# Patient Record
Sex: MALE | Race: WHITE | NOT HISPANIC OR LATINO | Employment: FULL TIME | URBAN - NONMETROPOLITAN AREA
[De-identification: names, ages, dates, MRNs, and addresses within clinical notes are randomized per-mention and may not be internally consistent; named-entity substitution may affect disease eponyms.]

---

## 2017-11-07 ENCOUNTER — APPOINTMENT (OUTPATIENT)
Dept: GENERAL RADIOLOGY | Facility: HOSPITAL | Age: 57
End: 2017-11-07

## 2017-11-07 ENCOUNTER — HOSPITAL ENCOUNTER (INPATIENT)
Facility: HOSPITAL | Age: 57
LOS: 3 days | Discharge: HOME | End: 2017-11-10
Attending: EMERGENCY MEDICINE | Admitting: INTERNAL MEDICINE

## 2017-11-07 DIAGNOSIS — N28.9 RENAL INSUFFICIENCY: ICD-10-CM

## 2017-11-07 DIAGNOSIS — I21.4 ACUTE NON-ST SEGMENT ELEVATION MYOCARDIAL INFARCTION (HCC): Primary | ICD-10-CM

## 2017-11-07 DIAGNOSIS — Z86.39 HISTORY OF DIABETES MELLITUS: ICD-10-CM

## 2017-11-07 LAB
ACT BLD: 208 SECONDS (ref 82–152)
ACT BLD: 221 SECONDS (ref 82–152)
ACT BLD: 225 SECONDS (ref 82–152)
ALBUMIN SERPL-MCNC: 4.9 G/DL (ref 3.5–5)
ALBUMIN/GLOB SERPL: 1.6 G/DL (ref 1–2)
ALP SERPL-CCNC: 109 U/L (ref 38–126)
ALT SERPL W P-5'-P-CCNC: 36 U/L (ref 13–69)
ANION GAP SERPL CALCULATED.3IONS-SCNC: 19.7 MMOL/L
AST SERPL-CCNC: 20 U/L (ref 15–46)
BASOPHILS # BLD AUTO: 0.05 10*3/MM3 (ref 0–0.2)
BASOPHILS NFR BLD AUTO: 0.7 % (ref 0–2.5)
BILIRUB SERPL-MCNC: 0.3 MG/DL (ref 0.2–1.3)
BUN BLD-MCNC: 27 MG/DL (ref 7–20)
BUN/CREAT SERPL: 15.9 (ref 6.3–21.9)
CALCIUM SPEC-SCNC: 9.6 MG/DL (ref 8.4–10.2)
CHLORIDE SERPL-SCNC: 103 MMOL/L (ref 98–107)
CO2 SERPL-SCNC: 26 MMOL/L (ref 26–30)
CREAT BLD-MCNC: 1.7 MG/DL (ref 0.6–1.3)
DEPRECATED RDW RBC AUTO: 42.9 FL (ref 37–54)
EOSINOPHIL # BLD AUTO: 0.12 10*3/MM3 (ref 0–0.7)
EOSINOPHIL NFR BLD AUTO: 1.7 % (ref 0–7)
ERYTHROCYTE [DISTWIDTH] IN BLOOD BY AUTOMATED COUNT: 12.7 % (ref 11.5–14.5)
GFR SERPL CREATININE-BSD FRML MDRD: 42 ML/MIN/1.73
GLOBULIN UR ELPH-MCNC: 3 GM/DL
GLUCOSE BLD-MCNC: 133 MG/DL (ref 74–98)
HCT VFR BLD AUTO: 45.3 % (ref 42–52)
HGB BLD-MCNC: 14.8 G/DL (ref 14–18)
HOLD SPECIMEN: NORMAL
HOLD SPECIMEN: NORMAL
IMM GRANULOCYTES # BLD: 0.02 10*3/MM3 (ref 0–0.06)
IMM GRANULOCYTES NFR BLD: 0.3 % (ref 0–0.6)
LIPASE SERPL-CCNC: 168 U/L (ref 23–300)
LYMPHOCYTES # BLD AUTO: 2.15 10*3/MM3 (ref 0.6–3.4)
LYMPHOCYTES NFR BLD AUTO: 30 % (ref 10–50)
MCH RBC QN AUTO: 30.1 PG (ref 27–31)
MCHC RBC AUTO-ENTMCNC: 32.7 G/DL (ref 30–37)
MCV RBC AUTO: 92.1 FL (ref 80–94)
MONOCYTES # BLD AUTO: 0.71 10*3/MM3 (ref 0–0.9)
MONOCYTES NFR BLD AUTO: 9.9 % (ref 0–12)
NEUTROPHILS # BLD AUTO: 4.11 10*3/MM3 (ref 2–6.9)
NEUTROPHILS NFR BLD AUTO: 57.4 % (ref 37–80)
NRBC BLD MANUAL-RTO: 0 /100 WBC (ref 0–0)
PLATELET # BLD AUTO: 181 10*3/MM3 (ref 130–400)
PMV BLD AUTO: 11.6 FL (ref 6–12)
POTASSIUM BLD-SCNC: 4.7 MMOL/L (ref 3.5–5.1)
PROT SERPL-MCNC: 7.9 G/DL (ref 6.3–8.2)
RBC # BLD AUTO: 4.92 10*6/MM3 (ref 4.7–6.1)
SODIUM BLD-SCNC: 144 MMOL/L (ref 137–145)
TROPONIN I SERPL-MCNC: 0 NG/ML (ref 0–0.05)
TROPONIN I SERPL-MCNC: 0.14 NG/ML (ref 0–0.03)
TROPONIN I SERPL-MCNC: <0.012 NG/ML (ref 0–0.03)
WBC NRBC COR # BLD: 7.16 10*3/MM3 (ref 4.8–10.8)
WHOLE BLOOD HOLD SPECIMEN: NORMAL
WHOLE BLOOD HOLD SPECIMEN: NORMAL

## 2017-11-07 PROCEDURE — 92941 PRQ TRLML REVSC TOT OCCL AMI: CPT | Performed by: INTERNAL MEDICINE

## 2017-11-07 PROCEDURE — 93005 ELECTROCARDIOGRAM TRACING: CPT

## 2017-11-07 PROCEDURE — B2111ZZ FLUOROSCOPY OF MULTIPLE CORONARY ARTERIES USING LOW OSMOLAR CONTRAST: ICD-10-PCS | Performed by: INTERNAL MEDICINE

## 2017-11-07 PROCEDURE — 027146Z DILATION OF CORONARY ARTERY, TWO ARTERIES WITH THREE DRUG-ELUTING INTRALUMINAL DEVICES, PERCUTANEOUS ENDOSCOPIC APPROACH: ICD-10-PCS | Performed by: INTERNAL MEDICINE

## 2017-11-07 PROCEDURE — C1760 CLOSURE DEV, VASC: HCPCS | Performed by: INTERNAL MEDICINE

## 2017-11-07 PROCEDURE — 4A023N7 MEASUREMENT OF CARDIAC SAMPLING AND PRESSURE, LEFT HEART, PERCUTANEOUS APPROACH: ICD-10-PCS | Performed by: INTERNAL MEDICINE

## 2017-11-07 PROCEDURE — C1874 STENT, COATED/COV W/DEL SYS: HCPCS | Performed by: INTERNAL MEDICINE

## 2017-11-07 PROCEDURE — 84484 ASSAY OF TROPONIN QUANT: CPT

## 2017-11-07 PROCEDURE — 93005 ELECTROCARDIOGRAM TRACING: CPT | Performed by: EMERGENCY MEDICINE

## 2017-11-07 PROCEDURE — C1769 GUIDE WIRE: HCPCS | Performed by: INTERNAL MEDICINE

## 2017-11-07 PROCEDURE — 85025 COMPLETE CBC W/AUTO DIFF WBC: CPT

## 2017-11-07 PROCEDURE — 99152 MOD SED SAME PHYS/QHP 5/>YRS: CPT | Performed by: INTERNAL MEDICINE

## 2017-11-07 PROCEDURE — C9600 PERC DRUG-EL COR STENT SING: HCPCS | Performed by: INTERNAL MEDICINE

## 2017-11-07 PROCEDURE — 25010000002 EPTIFIBATIDE PER 5 MG: Performed by: INTERNAL MEDICINE

## 2017-11-07 PROCEDURE — C1725 CATH, TRANSLUMIN NON-LASER: HCPCS | Performed by: INTERNAL MEDICINE

## 2017-11-07 PROCEDURE — 93458 L HRT ARTERY/VENTRICLE ANGIO: CPT | Performed by: INTERNAL MEDICINE

## 2017-11-07 PROCEDURE — 25010000002 FENTANYL CITRATE (PF) 100 MCG/2ML SOLUTION: Performed by: INTERNAL MEDICINE

## 2017-11-07 PROCEDURE — C9601 PERC DRUG-EL COR STENT BRAN: HCPCS | Performed by: INTERNAL MEDICINE

## 2017-11-07 PROCEDURE — 99153 MOD SED SAME PHYS/QHP EA: CPT | Performed by: INTERNAL MEDICINE

## 2017-11-07 PROCEDURE — C1887 CATHETER, GUIDING: HCPCS | Performed by: INTERNAL MEDICINE

## 2017-11-07 PROCEDURE — 25010000002 HEPARIN (PORCINE) PER 1000 UNITS: Performed by: INTERNAL MEDICINE

## 2017-11-07 PROCEDURE — 0 IOPAMIDOL PER 1 ML: Performed by: INTERNAL MEDICINE

## 2017-11-07 PROCEDURE — 25010000002 MIDAZOLAM PER 1 MG: Performed by: INTERNAL MEDICINE

## 2017-11-07 PROCEDURE — 85347 COAGULATION TIME ACTIVATED: CPT

## 2017-11-07 PROCEDURE — 92928 PRQ TCAT PLMT NTRAC ST 1 LES: CPT | Performed by: INTERNAL MEDICINE

## 2017-11-07 PROCEDURE — 80053 COMPREHEN METABOLIC PANEL: CPT

## 2017-11-07 PROCEDURE — 99223 1ST HOSP IP/OBS HIGH 75: CPT | Performed by: INTERNAL MEDICINE

## 2017-11-07 PROCEDURE — C1894 INTRO/SHEATH, NON-LASER: HCPCS | Performed by: INTERNAL MEDICINE

## 2017-11-07 PROCEDURE — 99285 EMERGENCY DEPT VISIT HI MDM: CPT

## 2017-11-07 PROCEDURE — C9606 PERC D-E COR REVASC W AMI S: HCPCS | Performed by: INTERNAL MEDICINE

## 2017-11-07 PROCEDURE — 84484 ASSAY OF TROPONIN QUANT: CPT | Performed by: PHYSICIAN ASSISTANT

## 2017-11-07 PROCEDURE — 83690 ASSAY OF LIPASE: CPT | Performed by: PHYSICIAN ASSISTANT

## 2017-11-07 PROCEDURE — 92929 PR PRQ TRLUML CORONARY STENT W/ANGIO ADDL ART/BRNCH: CPT | Performed by: INTERNAL MEDICINE

## 2017-11-07 DEVICE — XIENCE ALPINE EVEROLIMUS ELUTING CORONARY STENT SYSTEM 3.50 MM X 15 MM / RAPID-EXCHANGE
Type: IMPLANTABLE DEVICE | Status: FUNCTIONAL
Brand: XIENCE ALPINE

## 2017-11-07 DEVICE — XIENCE ALPINE EVEROLIMUS ELUTING CORONARY STENT SYSTEM 2.50 MM X 18 MM / RAPID-EXCHANGE
Type: IMPLANTABLE DEVICE | Status: FUNCTIONAL
Brand: XIENCE ALPINE

## 2017-11-07 DEVICE — XIENCE ALPINE EVEROLIMUS ELUTING CORONARY STENT SYSTEM 4.00 MM X 18 MM / RAPID-EXCHANGE
Type: IMPLANTABLE DEVICE | Status: FUNCTIONAL
Brand: XIENCE ALPINE

## 2017-11-07 DEVICE — XIENCE ALPINE EVEROLIMUS ELUTING CORONARY STENT SYSTEM 3.00 MM X 38 MM / RAPID-EXCHANGE
Type: IMPLANTABLE DEVICE | Status: FUNCTIONAL
Brand: XIENCE ALPINE

## 2017-11-07 RX ORDER — NITROGLYCERIN 5 MG/ML
INJECTION, SOLUTION INTRAVENOUS AS NEEDED
Status: DISCONTINUED | OUTPATIENT
Start: 2017-11-07 | End: 2017-11-07 | Stop reason: HOSPADM

## 2017-11-07 RX ORDER — ALPRAZOLAM 0.5 MG/1
0.5 TABLET ORAL 3 TIMES DAILY PRN
Status: DISCONTINUED | OUTPATIENT
Start: 2017-11-07 | End: 2017-11-10 | Stop reason: HOSPADM

## 2017-11-07 RX ORDER — LIDOCAINE HYDROCHLORIDE 10 MG/ML
INJECTION, SOLUTION INFILTRATION; PERINEURAL AS NEEDED
Status: DISCONTINUED | OUTPATIENT
Start: 2017-11-07 | End: 2017-11-07 | Stop reason: HOSPADM

## 2017-11-07 RX ORDER — ONDANSETRON 4 MG/1
4 TABLET, ORALLY DISINTEGRATING ORAL EVERY 6 HOURS PRN
Status: DISCONTINUED | OUTPATIENT
Start: 2017-11-07 | End: 2017-11-10 | Stop reason: HOSPADM

## 2017-11-07 RX ORDER — GLIPIZIDE 5 MG/1
2.5 TABLET ORAL
Status: DISCONTINUED | OUTPATIENT
Start: 2017-11-08 | End: 2017-11-10 | Stop reason: HOSPADM

## 2017-11-07 RX ORDER — SODIUM CHLORIDE 9 MG/ML
125 INJECTION, SOLUTION INTRAVENOUS CONTINUOUS
Status: ACTIVE | OUTPATIENT
Start: 2017-11-07 | End: 2017-11-08

## 2017-11-07 RX ORDER — PANTOPRAZOLE SODIUM 40 MG/1
40 TABLET, DELAYED RELEASE ORAL ONCE
Status: COMPLETED | OUTPATIENT
Start: 2017-11-07 | End: 2017-11-07

## 2017-11-07 RX ORDER — ONDANSETRON 4 MG/1
4 TABLET, FILM COATED ORAL EVERY 6 HOURS PRN
Status: DISCONTINUED | OUTPATIENT
Start: 2017-11-07 | End: 2017-11-10 | Stop reason: HOSPADM

## 2017-11-07 RX ORDER — ONDANSETRON 2 MG/ML
4 INJECTION INTRAMUSCULAR; INTRAVENOUS EVERY 6 HOURS PRN
Status: DISCONTINUED | OUTPATIENT
Start: 2017-11-07 | End: 2017-11-10 | Stop reason: HOSPADM

## 2017-11-07 RX ORDER — EPTIFIBATIDE 20 MG/10ML
180 INJECTION INTRAVENOUS ONCE
Status: DISCONTINUED | OUTPATIENT
Start: 2017-11-07 | End: 2017-11-07

## 2017-11-07 RX ORDER — EPTIFIBATIDE 0.75 MG/ML
2 INJECTION, SOLUTION INTRAVENOUS CONTINUOUS
Status: DISPENSED | OUTPATIENT
Start: 2017-11-07 | End: 2017-11-08

## 2017-11-07 RX ORDER — ASPIRIN 325 MG
TABLET ORAL AS NEEDED
Status: DISCONTINUED | OUTPATIENT
Start: 2017-11-07 | End: 2017-11-07 | Stop reason: HOSPADM

## 2017-11-07 RX ORDER — SODIUM CHLORIDE 0.9 % (FLUSH) 0.9 %
10 SYRINGE (ML) INJECTION AS NEEDED
Status: DISCONTINUED | OUTPATIENT
Start: 2017-11-07 | End: 2017-11-10 | Stop reason: HOSPADM

## 2017-11-07 RX ORDER — ATORVASTATIN CALCIUM 40 MG/1
40 TABLET, FILM COATED ORAL DAILY
COMMUNITY
End: 2018-03-20 | Stop reason: SDUPTHER

## 2017-11-07 RX ORDER — CARVEDILOL 25 MG/1
25 TABLET ORAL 2 TIMES DAILY
COMMUNITY
End: 2017-11-10 | Stop reason: HOSPADM

## 2017-11-07 RX ORDER — ASPIRIN 325 MG
325 TABLET ORAL ONCE
Status: DISCONTINUED | OUTPATIENT
Start: 2017-11-07 | End: 2017-11-08

## 2017-11-07 RX ORDER — TEMAZEPAM 15 MG/1
15 CAPSULE ORAL NIGHTLY PRN
Status: DISCONTINUED | OUTPATIENT
Start: 2017-11-07 | End: 2017-11-10 | Stop reason: HOSPADM

## 2017-11-07 RX ORDER — CARVEDILOL 6.25 MG/1
3.12 TABLET ORAL 2 TIMES DAILY
Status: DISCONTINUED | OUTPATIENT
Start: 2017-11-07 | End: 2017-11-10 | Stop reason: HOSPADM

## 2017-11-07 RX ORDER — HEPARIN SODIUM 1000 [USP'U]/ML
INJECTION, SOLUTION INTRAVENOUS; SUBCUTANEOUS AS NEEDED
Status: DISCONTINUED | OUTPATIENT
Start: 2017-11-07 | End: 2017-11-07 | Stop reason: HOSPADM

## 2017-11-07 RX ORDER — EPTIFIBATIDE 0.75 MG/ML
INJECTION, SOLUTION INTRAVENOUS CONTINUOUS PRN
Status: DISCONTINUED | OUTPATIENT
Start: 2017-11-07 | End: 2017-11-07 | Stop reason: HOSPADM

## 2017-11-07 RX ORDER — LISINOPRIL 20 MG/1
40 TABLET ORAL DAILY
Status: DISCONTINUED | OUTPATIENT
Start: 2017-11-08 | End: 2017-11-09

## 2017-11-07 RX ORDER — ASPIRIN 81 MG/1
81 TABLET, CHEWABLE ORAL DAILY
Status: DISCONTINUED | OUTPATIENT
Start: 2017-11-08 | End: 2017-11-10 | Stop reason: HOSPADM

## 2017-11-07 RX ORDER — SENNA AND DOCUSATE SODIUM 50; 8.6 MG/1; MG/1
2 TABLET, FILM COATED ORAL NIGHTLY
Status: DISCONTINUED | OUTPATIENT
Start: 2017-11-07 | End: 2017-11-10 | Stop reason: HOSPADM

## 2017-11-07 RX ORDER — BISACODYL 10 MG
10 SUPPOSITORY, RECTAL RECTAL DAILY PRN
Status: DISCONTINUED | OUTPATIENT
Start: 2017-11-07 | End: 2017-11-10 | Stop reason: HOSPADM

## 2017-11-07 RX ORDER — HYDROCODONE BITARTRATE AND ACETAMINOPHEN 7.5; 325 MG/1; MG/1
1 TABLET ORAL EVERY 4 HOURS PRN
Status: DISCONTINUED | OUTPATIENT
Start: 2017-11-07 | End: 2017-11-10 | Stop reason: HOSPADM

## 2017-11-07 RX ORDER — MIDAZOLAM HYDROCHLORIDE 1 MG/ML
INJECTION INTRAMUSCULAR; INTRAVENOUS AS NEEDED
Status: DISCONTINUED | OUTPATIENT
Start: 2017-11-07 | End: 2017-11-07 | Stop reason: HOSPADM

## 2017-11-07 RX ORDER — MORPHINE SULFATE 2 MG/ML
1 INJECTION, SOLUTION INTRAMUSCULAR; INTRAVENOUS EVERY 4 HOURS PRN
Status: DISCONTINUED | OUTPATIENT
Start: 2017-11-07 | End: 2017-11-10 | Stop reason: HOSPADM

## 2017-11-07 RX ORDER — LISINOPRIL 10 MG/1
40 TABLET ORAL DAILY
COMMUNITY
End: 2017-11-10 | Stop reason: HOSPADM

## 2017-11-07 RX ORDER — ALUMINA, MAGNESIA, AND SIMETHICONE 2400; 2400; 240 MG/30ML; MG/30ML; MG/30ML
10 SUSPENSION ORAL ONCE
Status: COMPLETED | OUTPATIENT
Start: 2017-11-07 | End: 2017-11-07

## 2017-11-07 RX ORDER — FENTANYL CITRATE 50 UG/ML
INJECTION, SOLUTION INTRAMUSCULAR; INTRAVENOUS AS NEEDED
Status: DISCONTINUED | OUTPATIENT
Start: 2017-11-07 | End: 2017-11-07 | Stop reason: HOSPADM

## 2017-11-07 RX ORDER — NALOXONE HCL 0.4 MG/ML
0.4 VIAL (ML) INJECTION
Status: DISCONTINUED | OUTPATIENT
Start: 2017-11-07 | End: 2017-11-10 | Stop reason: HOSPADM

## 2017-11-07 RX ORDER — DIPHENHYDRAMINE HCL 25 MG
25 CAPSULE ORAL EVERY 6 HOURS PRN
Status: DISCONTINUED | OUTPATIENT
Start: 2017-11-07 | End: 2017-11-10 | Stop reason: HOSPADM

## 2017-11-07 RX ORDER — DOCUSATE SODIUM 100 MG/1
100 CAPSULE, LIQUID FILLED ORAL 2 TIMES DAILY
Status: DISCONTINUED | OUTPATIENT
Start: 2017-11-07 | End: 2017-11-10 | Stop reason: HOSPADM

## 2017-11-07 RX ORDER — GLYBURIDE 5 MG/1
2.5 TABLET ORAL
COMMUNITY
End: 2018-03-20 | Stop reason: SDUPTHER

## 2017-11-07 RX ORDER — ATORVASTATIN CALCIUM 80 MG/1
80 TABLET, FILM COATED ORAL NIGHTLY
Status: DISCONTINUED | OUTPATIENT
Start: 2017-11-07 | End: 2017-11-10 | Stop reason: HOSPADM

## 2017-11-07 RX ORDER — AMLODIPINE BESYLATE 10 MG/1
10 TABLET ORAL DAILY
COMMUNITY
End: 2017-11-10 | Stop reason: HOSPADM

## 2017-11-07 RX ADMIN — SODIUM CHLORIDE 1000 ML: 9 INJECTION, SOLUTION INTRAVENOUS at 15:57

## 2017-11-07 RX ADMIN — EPTIFIBATIDE 2 MCG/KG/MIN: 75 INJECTION INTRAVENOUS at 20:40

## 2017-11-07 RX ADMIN — PANTOPRAZOLE SODIUM 40 MG: 40 TABLET, DELAYED RELEASE ORAL at 13:55

## 2017-11-07 RX ADMIN — ALUMINUM HYDROXIDE, MAGNESIUM HYDROXIDE, AND DIMETHICONE 10 ML: 400; 400; 40 SUSPENSION ORAL at 13:55

## 2017-11-07 RX ADMIN — SODIUM CHLORIDE 1000 ML: 9 INJECTION, SOLUTION INTRAVENOUS at 17:32

## 2017-11-07 RX ADMIN — ATORVASTATIN CALCIUM 80 MG: 80 TABLET, FILM COATED ORAL at 22:02

## 2017-11-07 RX ADMIN — SODIUM CHLORIDE 125 ML/HR: 9 INJECTION, SOLUTION INTRAVENOUS at 22:07

## 2017-11-07 NOTE — ED NOTES
Faxed EKG to Dr. Johnson's office per TAYLER Latahm at this time.          Diane Martins  11/07/17 2761

## 2017-11-07 NOTE — ED NOTES
Upon preparing patient for cath lab pt reports he doesn't want to have the procedure, pt states he doesn't have the money for the procedure and doesn't have insurance.  I explained the importance of the procedure and also provided him with the financial . Hernandez LESTER notified, Cath lab team notified and Dr Johnson notified.  Alex came to bedside to talk with patient to which the patient told him he didn't want the procedure. After the MD left the room I talked with the patient again about the importance of the procedure, he stated he wanted about 20 minutes to think about it.       Lynette Glez, RN  11/07/17 1973

## 2017-11-07 NOTE — ED NOTES
Dr. Johnson returned call, transferred call to TAYLER Rush at this time.      Diane Martins  11/07/17 1525

## 2017-11-08 ENCOUNTER — APPOINTMENT (OUTPATIENT)
Dept: CARDIOLOGY | Facility: HOSPITAL | Age: 57
End: 2017-11-08
Attending: INTERNAL MEDICINE

## 2017-11-08 LAB
ANION GAP SERPL CALCULATED.3IONS-SCNC: 17.5 MMOL/L
BH CV ECHO MEAS - % IVS THICK: 5.2 %
BH CV ECHO MEAS - % LVPW THICK: 34.2 %
BH CV ECHO MEAS - AO ACC SLOPE: 2250 CM/SEC^2
BH CV ECHO MEAS - AO ACC TIME: 0.07 SEC
BH CV ECHO MEAS - AO MAX PG (FULL): 3.5 MMHG
BH CV ECHO MEAS - AO MAX PG: 12 MMHG
BH CV ECHO MEAS - AO MEAN PG (FULL): 1.6 MMHG
BH CV ECHO MEAS - AO MEAN PG: 5.6 MMHG
BH CV ECHO MEAS - AO ROOT AREA (BSA CORRECTED): 1.1
BH CV ECHO MEAS - AO ROOT AREA: 6 CM^2
BH CV ECHO MEAS - AO ROOT DIAM: 2.8 CM
BH CV ECHO MEAS - AO V2 MAX: 172.4 CM/SEC
BH CV ECHO MEAS - AO V2 MEAN: 108.1 CM/SEC
BH CV ECHO MEAS - AO V2 VTI: 32.7 CM
BH CV ECHO MEAS - AVA(I,A): 2.7 CM^2
BH CV ECHO MEAS - AVA(I,D): 2.7 CM^2
BH CV ECHO MEAS - AVA(V,A): 2.5 CM^2
BH CV ECHO MEAS - AVA(V,D): 2.5 CM^2
BH CV ECHO MEAS - BSA(HAYCOCK): 2.6 M^2
BH CV ECHO MEAS - BSA: 2.4 M^2
BH CV ECHO MEAS - BZI_BMI: 38.5 KILOGRAMS/M^2
BH CV ECHO MEAS - BZI_METRIC_HEIGHT: 180.3 CM
BH CV ECHO MEAS - BZI_METRIC_WEIGHT: 125.2 KG
BH CV ECHO MEAS - CONTRAST EF 4CH: 52.6 ML/M^2
BH CV ECHO MEAS - EDV(CUBED): 205.5 ML
BH CV ECHO MEAS - EDV(MOD-SP4): 133 ML
BH CV ECHO MEAS - EDV(TEICH): 173.3 ML
BH CV ECHO MEAS - EF(CUBED): 47.3 %
BH CV ECHO MEAS - EF(MOD-SP4): 52.6 %
BH CV ECHO MEAS - EF(TEICH): 39 %
BH CV ECHO MEAS - ESV(CUBED): 108.3 ML
BH CV ECHO MEAS - ESV(MOD-SP4): 63 ML
BH CV ECHO MEAS - ESV(TEICH): 105.7 ML
BH CV ECHO MEAS - FS: 19.2 %
BH CV ECHO MEAS - IVS/LVPW: 1
BH CV ECHO MEAS - IVSD: 1.1 CM
BH CV ECHO MEAS - IVSS: 1.1 CM
BH CV ECHO MEAS - LA DIMENSION: 4.2 CM
BH CV ECHO MEAS - LA/AO: 1.5
BH CV ECHO MEAS - LV DIASTOLIC VOL/BSA (35-75): 55 ML/M^2
BH CV ECHO MEAS - LV MASS(C)D: 260 GRAMS
BH CV ECHO MEAS - LV MASS(C)DI: 107.5 GRAMS/M^2
BH CV ECHO MEAS - LV MASS(C)S: 235 GRAMS
BH CV ECHO MEAS - LV MASS(C)SI: 97.2 GRAMS/M^2
BH CV ECHO MEAS - LV MAX PG: 8.5 MMHG
BH CV ECHO MEAS - LV MEAN PG: 4 MMHG
BH CV ECHO MEAS - LV SYSTOLIC VOL/BSA (12-30): 26.1 ML/M^2
BH CV ECHO MEAS - LV V1 MAX: 145.6 CM/SEC
BH CV ECHO MEAS - LV V1 MEAN: 91.2 CM/SEC
BH CV ECHO MEAS - LV V1 VTI: 29.9 CM
BH CV ECHO MEAS - LVIDD: 5.9 CM
BH CV ECHO MEAS - LVIDS: 4.8 CM
BH CV ECHO MEAS - LVLD AP4: 8.4 CM
BH CV ECHO MEAS - LVLS AP4: 6.7 CM
BH CV ECHO MEAS - LVOT AREA (M): 3.1 CM^2
BH CV ECHO MEAS - LVOT AREA: 3 CM^2
BH CV ECHO MEAS - LVOT DIAM: 2 CM
BH CV ECHO MEAS - LVPWD: 1 CM
BH CV ECHO MEAS - LVPWS: 1.4 CM
BH CV ECHO MEAS - MV A MAX VEL: 79 CM/SEC
BH CV ECHO MEAS - MV DEC SLOPE: 323.1 CM/SEC^2
BH CV ECHO MEAS - MV E MAX VEL: 113.5 CM/SEC
BH CV ECHO MEAS - MV E/A: 1.4
BH CV ECHO MEAS - MV MAX PG: 5.6 MMHG
BH CV ECHO MEAS - MV MEAN PG: 2 MMHG
BH CV ECHO MEAS - MV P1/2T MAX VEL: 120.8 CM/SEC
BH CV ECHO MEAS - MV P1/2T: 109.5 MSEC
BH CV ECHO MEAS - MV V2 MAX: 118.1 CM/SEC
BH CV ECHO MEAS - MV V2 MEAN: 64.8 CM/SEC
BH CV ECHO MEAS - MV V2 VTI: 39.7 CM
BH CV ECHO MEAS - MVA P1/2T LCG: 1.8 CM^2
BH CV ECHO MEAS - MVA(P1/2T): 2 CM^2
BH CV ECHO MEAS - MVA(VTI): 2.3 CM^2
BH CV ECHO MEAS - PA ACC SLOPE: 807.7 CM/SEC^2
BH CV ECHO MEAS - PA ACC TIME: 0.15 SEC
BH CV ECHO MEAS - PA MAX PG: 5.1 MMHG
BH CV ECHO MEAS - PA MEAN PG: 2.7 MMHG
BH CV ECHO MEAS - PA PR(ACCEL): 12.5 MMHG
BH CV ECHO MEAS - PA V2 MAX: 112.7 CM/SEC
BH CV ECHO MEAS - PA V2 MEAN: 74.8 CM/SEC
BH CV ECHO MEAS - PA V2 VTI: 23.5 CM
BH CV ECHO MEAS - PI END-D VEL: 146.6 CM/SEC
BH CV ECHO MEAS - SI(AO): 80.8 ML/M^2
BH CV ECHO MEAS - SI(CUBED): 40.2 ML/M^2
BH CV ECHO MEAS - SI(LVOT): 37.2 ML/M^2
BH CV ECHO MEAS - SI(MOD-SP4): 28.9 ML/M^2
BH CV ECHO MEAS - SI(TEICH): 27.9 ML/M^2
BH CV ECHO MEAS - SV(AO): 195.4 ML
BH CV ECHO MEAS - SV(CUBED): 97.3 ML
BH CV ECHO MEAS - SV(LVOT): 89.9 ML
BH CV ECHO MEAS - SV(MOD-SP4): 70 ML
BH CV ECHO MEAS - SV(TEICH): 67.6 ML
BUN BLD-MCNC: 22 MG/DL (ref 7–20)
BUN/CREAT SERPL: 16.9 (ref 6.3–21.9)
CALCIUM SPEC-SCNC: 8.8 MG/DL (ref 8.4–10.2)
CHLORIDE SERPL-SCNC: 107 MMOL/L (ref 98–107)
CHOLEST SERPL-MCNC: 135 MG/DL (ref 0–199)
CO2 SERPL-SCNC: 21 MMOL/L (ref 26–30)
CREAT BLD-MCNC: 1.3 MG/DL (ref 0.6–1.3)
DEPRECATED RDW RBC AUTO: 41.9 FL (ref 37–54)
ERYTHROCYTE [DISTWIDTH] IN BLOOD BY AUTOMATED COUNT: 12.6 % (ref 11.5–14.5)
GFR SERPL CREATININE-BSD FRML MDRD: 57 ML/MIN/1.73
GLUCOSE BLD-MCNC: 132 MG/DL (ref 74–98)
HBA1C MFR BLD: 7.9 % (ref 3–6)
HCT VFR BLD AUTO: 40.2 % (ref 42–52)
HDLC SERPL-MCNC: 29 MG/DL (ref 40–60)
HGB BLD-MCNC: 13.1 G/DL (ref 14–18)
LDLC SERPL CALC-MCNC: 69 MG/DL (ref 0–99)
LDLC/HDLC SERPL: 2.39 {RATIO}
LEFT ATRIUM VOLUME INDEX: 34 ML/M2
LV EF 2D ECHO EST: 68 %
MAXIMAL PREDICTED HEART RATE: 163 BPM
MCH RBC QN AUTO: 29.7 PG (ref 27–31)
MCHC RBC AUTO-ENTMCNC: 32.6 G/DL (ref 30–37)
MCV RBC AUTO: 91.2 FL (ref 80–94)
PLATELET # BLD AUTO: 155 10*3/MM3 (ref 130–400)
PMV BLD AUTO: 12.1 FL (ref 6–12)
POTASSIUM BLD-SCNC: 4.5 MMOL/L (ref 3.5–5.1)
RBC # BLD AUTO: 4.41 10*6/MM3 (ref 4.7–6.1)
SODIUM BLD-SCNC: 141 MMOL/L (ref 137–145)
STRESS TARGET HR: 139 BPM
TRIGL SERPL-MCNC: 184 MG/DL
VLDLC SERPL-MCNC: 36.8 MG/DL
WBC NRBC COR # BLD: 8.26 10*3/MM3 (ref 4.8–10.8)

## 2017-11-08 PROCEDURE — 93306 TTE W/DOPPLER COMPLETE: CPT | Performed by: INTERNAL MEDICINE

## 2017-11-08 PROCEDURE — 25010000002 ONDANSETRON PER 1 MG: Performed by: INTERNAL MEDICINE

## 2017-11-08 PROCEDURE — 85027 COMPLETE CBC AUTOMATED: CPT | Performed by: INTERNAL MEDICINE

## 2017-11-08 PROCEDURE — 25010000002 EPTIFIBATIDE PER 5 MG: Performed by: INTERNAL MEDICINE

## 2017-11-08 PROCEDURE — 93306 TTE W/DOPPLER COMPLETE: CPT

## 2017-11-08 PROCEDURE — 99232 SBSQ HOSP IP/OBS MODERATE 35: CPT | Performed by: INTERNAL MEDICINE

## 2017-11-08 PROCEDURE — 80048 BASIC METABOLIC PNL TOTAL CA: CPT | Performed by: INTERNAL MEDICINE

## 2017-11-08 PROCEDURE — 93005 ELECTROCARDIOGRAM TRACING: CPT | Performed by: INTERNAL MEDICINE

## 2017-11-08 PROCEDURE — 83036 HEMOGLOBIN GLYCOSYLATED A1C: CPT | Performed by: INTERNAL MEDICINE

## 2017-11-08 PROCEDURE — 80061 LIPID PANEL: CPT | Performed by: INTERNAL MEDICINE

## 2017-11-08 RX ORDER — RANOLAZINE 500 MG/1
500 TABLET, EXTENDED RELEASE ORAL EVERY 12 HOURS SCHEDULED
Status: DISCONTINUED | OUTPATIENT
Start: 2017-11-08 | End: 2017-11-10 | Stop reason: HOSPADM

## 2017-11-08 RX ORDER — PANTOPRAZOLE SODIUM 40 MG/1
40 TABLET, DELAYED RELEASE ORAL
Status: DISCONTINUED | OUTPATIENT
Start: 2017-11-08 | End: 2017-11-10 | Stop reason: HOSPADM

## 2017-11-08 RX ORDER — ISOSORBIDE MONONITRATE 30 MG/1
30 TABLET, EXTENDED RELEASE ORAL
Status: DISCONTINUED | OUTPATIENT
Start: 2017-11-08 | End: 2017-11-10 | Stop reason: HOSPADM

## 2017-11-08 RX ADMIN — ATORVASTATIN CALCIUM 80 MG: 80 TABLET, FILM COATED ORAL at 20:17

## 2017-11-08 RX ADMIN — TICAGRELOR 90 MG: 90 TABLET ORAL at 09:45

## 2017-11-08 RX ADMIN — CARVEDILOL 3.12 MG: 6.25 TABLET, FILM COATED ORAL at 17:19

## 2017-11-08 RX ADMIN — EPTIFIBATIDE 2 MCG/KG/MIN: 75 INJECTION INTRAVENOUS at 10:21

## 2017-11-08 RX ADMIN — RANOLAZINE 500 MG: 500 TABLET, FILM COATED, EXTENDED RELEASE ORAL at 20:17

## 2017-11-08 RX ADMIN — LISINOPRIL 40 MG: 20 TABLET ORAL at 09:46

## 2017-11-08 RX ADMIN — TICAGRELOR 90 MG: 90 TABLET ORAL at 17:19

## 2017-11-08 RX ADMIN — ISOSORBIDE MONONITRATE 30 MG: 30 TABLET, EXTENDED RELEASE ORAL at 17:19

## 2017-11-08 RX ADMIN — MAGNESIUM HYDROXIDE 10 ML: 2400 SUSPENSION ORAL at 15:58

## 2017-11-08 RX ADMIN — ASPIRIN 81 MG 81 MG: 81 TABLET ORAL at 09:47

## 2017-11-08 RX ADMIN — GLIPIZIDE 2.5 MG: 5 TABLET ORAL at 09:45

## 2017-11-08 RX ADMIN — DOCUSATE SODIUM 100 MG: 100 CAPSULE, LIQUID FILLED ORAL at 17:19

## 2017-11-08 RX ADMIN — CARVEDILOL 3.12 MG: 6.25 TABLET, FILM COATED ORAL at 09:46

## 2017-11-08 RX ADMIN — ONDANSETRON 4 MG: 2 INJECTION INTRAMUSCULAR; INTRAVENOUS at 12:45

## 2017-11-08 RX ADMIN — HYDROCODONE BITARTRATE AND ACETAMINOPHEN 1 TABLET: 7.5; 325 TABLET ORAL at 15:58

## 2017-11-08 RX ADMIN — Medication 2 TABLET: at 20:17

## 2017-11-08 RX ADMIN — PANTOPRAZOLE SODIUM 40 MG: 40 TABLET, DELAYED RELEASE ORAL at 17:19

## 2017-11-08 RX ADMIN — DOCUSATE SODIUM 100 MG: 100 CAPSULE, LIQUID FILLED ORAL at 09:48

## 2017-11-08 RX ADMIN — EPTIFIBATIDE 2 MCG/KG/MIN: 75 INJECTION INTRAVENOUS at 05:43

## 2017-11-08 NOTE — H&P
Patient Care Team:  Sofia Novoa MD as PCP - General (Family Medicine)    Chief complaint : Chest pain    Subjective     This is a 57-year-old male patient who presented to the emergency room with severe retrosternal chest discomfort which she felt was possibly heartburn or indigestion.  The discomfort had a burning quality and did not radiate.  There was no shortness of breath but he did experience some nausea but no diaphoresis.  The patient was initially given a GI cocktail with some improvement in his chest discomfort.  His 12-lead electrocardiogram showed J-point elevation in the inferior and lateral leads with a sloping-type ST segment elevation and reciprocal ST segment depression in the high lateral leads.  While this pattern was not classic for an acute injury current there was suspicion that this was an ST elevation myocardial infarction.  The patient's initial troponin was negative and a repeat troponin was less than 0.012.  The patient was recommended to have coronary angiography with interventional standby.  I had a long discussion with the patient and his family regarding the procedure of cardiac catheterization including the risks benefits and alternatives.  Special emphasis was placed on the fact that the patient has chronic renal insufficiency and there is concern that the patient could have contrast-induced worsening renal failure.  The patient indicated that he was reluctant to undergo any procedures and would prefer only medical therapy.  Approximately one hour later the patient had ongoing discussions with the emergency room staff and changed his mind indicating that he would agree to have cardiac catheterization.  He was then brought directly to the cardiac catheterization laboratory.  The patient indicates that he has no prior history of myocardial infarction or coronary revascularization.  He does have a history of hypertension and diabetes.  His cholesterol status is unknown.   He has chronic renal insufficiency with a baseline serum creatinine of 1.7.  The patient is a lifelong nonsmoker.  His family history is strongly positive for premature coronary disease.  The patient reports having shortness of breath with activity.  He has had no orthopnea PND or lower extremity edema.  There is no dizziness palpitations or syncope.    Review of Systems   Pertinent items are noted in HPI    History  Past Medical History:   Diagnosis Date   • Diabetes mellitus    • Hypertension      History reviewed. No pertinent surgical history.  History reviewed. No pertinent family history.  Social History   Substance Use Topics   • Smoking status: Never Smoker   • Smokeless tobacco: None   • Alcohol use No     Prescriptions Prior to Admission   Medication Sig Dispense Refill Last Dose   • amLODIPine (NORVASC) 10 MG tablet Take 10 mg by mouth Daily.      • glyBURIDE (DIAbeta) 5 MG tablet Take  by mouth Daily With Breakfast. Pt unsure of dosage      • lisinopril (PRINIVIL,ZESTRIL) 10 MG tablet Take 40 mg by mouth Daily.        Allergies:  Review of patient's allergies indicates no known allergies.    Objective     Vital Signs  Temp:  [97.9 °F (36.6 °C)] 97.9 °F (36.6 °C)  Heart Rate:  [49-56] 50  Resp:  [18] 18  BP: ()/(78-98) 143/83    Physical Exam:      General Appearance:    Alert, cooperative, in no acute distress   Head:    Normocephalic, without obvious abnormality, atraumatic   Eyes:            Lids and lashes normal, conjunctivae and sclerae normal, no   icterus, no pallor, corneas clear, PERRLA   Ears:    Ears appear intact with no abnormalities noted   Throat:   No oral lesions, no thrush, oral mucosa moist   Neck:   No adenopathy, supple, trachea midline, no thyromegaly, no   carotid bruit, no JVD   Back:     No kyphosis present, no scoliosis present, no skin lesions,      erythema or scars, no tenderness to percussion or                   palpation,   range of motion normal   Lungs:     Clear  "to auscultation,respirations regular, even and                  unlabored    Heart:    Regular rhythm and normal rate, normal S1 and S2, no            murmur, no gallop, no rub, no click   Chest Wall:    No abnormalities observed   Abdomen:     Normal bowel sounds, no masses, no organomegaly, soft        non-tender, non-distended, no guarding, no rebound                tenderness   Rectal:     Deferred   Extremities:   Moves all extremities well, no edema, no cyanosis, no             redness   Pulses:   Pulses palpable and equal bilaterally   Skin:   No bleeding, bruising or rash   Lymph nodes:   No palpable adenopathy   Neurologic:   Cranial nerves 2 - 12 grossly intact, sensation intact, DTR       present and equal bilaterally       Results Review:    I reviewed the patient's new clinical results.  Assessment/Plan     Principal Problem:    Acute ST segment elevation myocardial infarction- the patient's ST segment elevation does not have the classic \"tombstone\" pattern.  There is however a sloped form of ST segment elevation with prominent T-wave inversion in the lateral leads with subtle ST segment depression in a reciprocal lead of 1 and aVL.  The patient has ongoing chest discomfort which is suspicious for myocardial infarction.  The patient initially refused cardiac catheterization with an eye towards primary PCI.  After approximately one hour he is now agreeable to proceed with coronary angiography.  Given his delay regarding his initial decision not to have catheterization we will not be able to meet the normal nyit-ga-juyaaev time.  The patient had not been administered aspirin or antiplatelet therapy in the emergency room.  We have given him aspirin in the catheter lab as well as a oral loading dose of Brilinta.       Further recommendations will be predicated on the results of his catheterization findings.    I discussed the patients findings and my recommendations with patient, family and nursing staff. "     David Johnson MD  11/07/17  9:06 PM

## 2017-11-08 NOTE — PLAN OF CARE
Problem: Patient Care Overview (Adult)  Goal: Plan of Care Review  Outcome: Ongoing (interventions implemented as appropriate)    11/08/17 0306   Coping/Psychosocial Response Interventions   Plan Of Care Reviewed With patient   Patient Care Overview   Progress no change         Problem: Cardiac Catheterization with/without PCI (Adult)  Goal: Signs and Symptoms of Listed Potential Problems Will be Absent or Manageable (Cardiac Catheterization with/without PCI)  Outcome: Ongoing (interventions implemented as appropriate)    11/08/17 0306   Cardiac Catheterization with/without PCI   Problems Assessed (Cardiac Catheterization) all   Problems Present (Cardiac Catheterization) none

## 2017-11-08 NOTE — DISCHARGE INSTR - OTHER ORDERS
If you have any questions about your recovery, please call the [facility] Nurse Call Center at [phone]. A registered nurse is available 24 hours a day 7 days a week to assist you.

## 2017-11-08 NOTE — NURSING NOTE
Phase 1 consult complete.  Pt states he might be interested in the program.  Appointment letter left. - Dot Cabrera RN

## 2017-11-08 NOTE — PROGRESS NOTES
Continued Stay Note   Darvin     Patient Name: Jorge Collazo  MRN: [id number]  Today's Date: 11/8/2017    Admit Date: 11/7/2017          Discharge Plan       11/08/17 1252    Case Management/Social Work Plan    Plan CASEY spoke to family regarding discharg eplanning. No O2, dme or home health. Working prior to admission.Trouble getting medicines. No insurance. Referred family to [facility] packet and medicaid.  Will follow to get medication access prior to admission.     Patient/Family In Agreement With Plan yes    Additional Comments makes own decision, verified PCP and address.     Final Note    Final Note Following for social and discharge planning.               Discharge Codes     None        Expected Discharge Date and Time     Expected Discharge Date Expected Discharge Time    Nov 9, 2017             María Law
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
## 2017-11-08 NOTE — PROGRESS NOTES
"Adult Nutrition  Assessment/PES    Patient Name:  Jorge Collazo  YOB: 1960  MRN: [id number]  Admit Date:  11/7/2017    Assessment Date:  11/8/2017    Comments:  Rec. #1: Continue with current diet order. Rec. #2: Consider adding MVI once medically feasible. RD to follow pt. Consult RD PRN.           Reason for Assessment       11/08/17 0905    Reason for Assessment    Reason For Assessment/Visit admission assessment    Identified At Risk By Screening Criteria MST SCORE 2+;BMI;diagnosis;unintentional loss of 10 lbs or more in the past 2 mos    Diagnosis Diagnosis    Cardiac MI    Endocrine DM Type 2                  Labs/Tests/Procedures/Meds       11/08/17 0906    Labs/Tests/Procedures/Meds    Labs/Tests Review Reviewed;Glucose;BUN;Hgb Hct   High: Gluc, BUN    Medication Review Reviewed, pertinent;Diabetic Oral Agent            Physical Findings       11/08/17 0907    Physical Findings/Assessment    Additional Documentation Physical Appearance (Group)    Physical Appearance    Overall Physical Appearance obese            Estimated/Assessed Needs       11/08/17 0907    Calculation Measurements    Weight Used For Calculations 79.3 kg (174 lb 12.1 oz)    Height Used for Calculations 1.803 m (5' 11\")    Estimated/Assessed Energy Needs    Energy Need Method [place]-St Jeor    Age 57    RMR ([place]-St. Jeor Equation) 1639.82    Activity Factors ([place] St Jeor)  Out of bed, ambulatory  1.3    Estimated Kcal Range  ~5542-5947    Estimated/Assessed Protein Needs    Weight Used for Protein Calculation 79.3 kg (174 lb 12.1 oz)    Protein (gm/kg) 1.2    1.2 Gm Protein (gm) 95.12    Estimated Protein Range ~79.27-95.12    Estimated/Assessed Fluid Needs    Fluid Need Method RDA method    RDA Method (mL)  2400            Nutrition Prescription Ordered       11/08/17 0908    Nutrition Prescription PO    Current PO Diet Regular    Common Modifiers Consistent Carbohydrate;Cardiac;Renal            Evaluation of " Received Nutrient/Fluid Intake       11/08/17 0908    PO Evaluation    Number of Days PO Intake Evaluated Insufficient Data            Problem/Interventions:        Problem 1       11/08/17 0908    Nutrition Diagnoses Problem 1    Problem 1 Overweight/Obesity    Etiology (related to) Factors Affecting Nutrition    Food Habit/Preferences Large Meals    Signs/Symptoms (evidenced by) BMI    BMI 35 - 39.9            Problem 2       11/08/17 0909    Nutrition Diagnoses Problem 2    Problem 2 Altered Nutrition Related to Labs    Etiology (related to) Medical Diagnosis    Endocrine DM Type 2    Signs/Symptoms (evidenced by) Biochemical    Labs Reviewed Done    Specific Labs Noted Glucose                  Intervention Goal       11/08/17 0909    Intervention Goal    General Meet nutritional needs for age/condition    PO PO intake (%)    PO Intake % 50 %            Nutrition Intervention       11/08/17 0909    Nutrition Intervention    RD/Tech Action Follow Tx progress;Encourage intake;Interview for preference;Advise available snack            Nutrition Prescription       11/08/17 0909    Nutrition Prescription PO    PO Prescription --   Continue with current diet orders            Education/Evaluation       11/08/17 0910    Education    Education Provided education regarding;Education topics    Education Topics Cardiac diabetic   Wt. management    Monitor/Evaluation    Monitor Per protocol;PO intake;Pertinent labs;Weight        Electronically signed by:  Glenna Davis RD  11/08/17 9:10 AM

## 2017-11-08 NOTE — PLAN OF CARE
Problem: Patient Care Overview (Adult)  Goal: Plan of Care Review  Outcome: Ongoing (interventions implemented as appropriate)    11/08/17 9049   Coping/Psychosocial Response Interventions   Plan Of Care Reviewed With patient;spouse   Patient Care Overview   Progress no change   Outcome Evaluation   Outcome Summary/Follow up Plan Pt. showing minimal signs of improvement throughout he shift. VSS throughout shift and will continue to monitor.         Problem: Cardiac Catheterization with/without PCI (Adult)  Goal: Signs and Symptoms of Listed Potential Problems Will be Absent or Manageable (Cardiac Catheterization with/without PCI)  Outcome: Ongoing (interventions implemented as appropriate)

## 2017-11-09 PROCEDURE — 99232 SBSQ HOSP IP/OBS MODERATE 35: CPT | Performed by: INTERNAL MEDICINE

## 2017-11-09 RX ORDER — LISINOPRIL 10 MG/1
10 TABLET ORAL DAILY
Status: DISCONTINUED | OUTPATIENT
Start: 2017-11-10 | End: 2017-11-10 | Stop reason: HOSPADM

## 2017-11-09 RX ADMIN — RANOLAZINE 500 MG: 500 TABLET, FILM COATED, EXTENDED RELEASE ORAL at 08:10

## 2017-11-09 RX ADMIN — DOCUSATE SODIUM 100 MG: 100 CAPSULE, LIQUID FILLED ORAL at 08:07

## 2017-11-09 RX ADMIN — HYDROCODONE BITARTRATE AND ACETAMINOPHEN 1 TABLET: 7.5; 325 TABLET ORAL at 20:04

## 2017-11-09 RX ADMIN — GLIPIZIDE 2.5 MG: 5 TABLET ORAL at 06:40

## 2017-11-09 RX ADMIN — ATORVASTATIN CALCIUM 80 MG: 80 TABLET, FILM COATED ORAL at 20:05

## 2017-11-09 RX ADMIN — ISOSORBIDE MONONITRATE 30 MG: 30 TABLET, EXTENDED RELEASE ORAL at 08:07

## 2017-11-09 RX ADMIN — DOCUSATE SODIUM 100 MG: 100 CAPSULE, LIQUID FILLED ORAL at 17:33

## 2017-11-09 RX ADMIN — TICAGRELOR 90 MG: 90 TABLET ORAL at 17:32

## 2017-11-09 RX ADMIN — ASPIRIN 81 MG 81 MG: 81 TABLET ORAL at 08:10

## 2017-11-09 RX ADMIN — PANTOPRAZOLE SODIUM 40 MG: 40 TABLET, DELAYED RELEASE ORAL at 17:32

## 2017-11-09 RX ADMIN — Medication 2 TABLET: at 20:05

## 2017-11-09 RX ADMIN — CARVEDILOL 3.12 MG: 6.25 TABLET, FILM COATED ORAL at 08:10

## 2017-11-09 RX ADMIN — PANTOPRAZOLE SODIUM 40 MG: 40 TABLET, DELAYED RELEASE ORAL at 06:40

## 2017-11-09 RX ADMIN — CARVEDILOL 3.12 MG: 6.25 TABLET, FILM COATED ORAL at 17:33

## 2017-11-09 RX ADMIN — TICAGRELOR 90 MG: 90 TABLET ORAL at 08:09

## 2017-11-09 RX ADMIN — RANOLAZINE 500 MG: 500 TABLET, FILM COATED, EXTENDED RELEASE ORAL at 20:05

## 2017-11-09 NOTE — PLAN OF CARE
"Problem: Patient Care Overview (Adult)  Goal: Plan of Care Review  Outcome: Ongoing (interventions implemented as appropriate)    11/09/17 0238   Coping/Psychosocial Response Interventions   Plan Of Care Reviewed With patient   Patient Care Overview   Progress no change   Outcome Evaluation   Outcome Summary/Follow up Plan no change this shift pt reports feeling tired and achey. Requests to \"be left alone to sleep\"       Goal: Discharge Needs Assessment    11/07/17 2136 11/08/17 1250 11/09/17 0238   Living Environment   Transportation Available --  --  family or friend will provide;car   Discharge Needs Assessment   Concerns To Be Addressed --  medication concerns;financial/insurance concerns --    Readmission Within The Last 30 Days --  no previous admission in last 30 days --    Current Discharge Risk --  --  (noncompliance with medication and appts)   Discharge Planning Comments --  Home with wife.  --    Self-Care   Equipment Currently Used at Home glucometer --  --          Problem: Cardiac Catheterization with/without PCI (Adult)  Goal: Signs and Symptoms of Listed Potential Problems Will be Absent or Manageable (Cardiac Catheterization with/without PCI)  Outcome: Ongoing (interventions implemented as appropriate)    11/09/17 0238   Cardiac Catheterization with/without PCI   Problems Assessed (Cardiac Catheterization) all   Problems Present (Cardiac Catheterization) none           "

## 2017-11-09 NOTE — PROGRESS NOTES
Continued Stay Note   Darvin     Patient Name: Jorge Collazo  MRN: [id number]  Today's Date: 11/9/2017    Admit Date: 11/7/2017          Discharge Plan       11/09/17 1102    Case Management/Social Work Plan    Plan Attempted to discuss coupon for Brillinta with patient, but he has visitors. Left coupon and contact information and will return to discuss at a later time.               Discharge Codes     None        Expected Discharge Date and Time     Expected Discharge Date Expected Discharge Time    Nov 9, 2017             María Law

## 2017-11-09 NOTE — PROGRESS NOTES
"[facility] Progress note     LOS: 2 days   Patient Care Team:  Sofia Novoa MD as PCP - General (Family Medicine)    Chief Complaint:  Chest Pain    Subjective     Interval History:     Patient Complaints: none  Patient Denies:  Chest pain, shortness of breath, orthopnea, peripheral edema, dizziness, bright red blood per rectum, dark tarry stools, fevers chills night sweats, nausea vomiting diarrhea.  History taken from: patient    Review of Systems:   All systems were reviewed and negative       Objective     Vital Sign Min/Max for last 24 hours  Temp  Min: 98.2 °F (36.8 °C)  Max: 99.3 °F (37.4 °C)   BP  Min: 83/47  Max: 139/71   Pulse  Min: 50  Max: 68   Resp  Min: 8  Max: 18   SpO2  Min: 91 %  Max: 99 %   Flow (L/min)  Min: 2  Max: 2   Weight  Min: 275 lb 6.4 oz (125 kg)  Max: 275 lb 6.4 oz (125 kg)     Flowsheet Rows         First Filed Value    Admission Height  71\" (180.3 cm) Documented at 11/07/2017 1329    Admission Weight  270 lb (122 kg) Documented at 11/07/2017 1329          Physical Exam:     General Appearance:    Alert, cooperative, in no acute distress   Head:    Normocephalic, without obvious abnormality, atraumatic   Eyes:            Lids and lashes normal, conjunctivae and sclerae normal, no   icterus, no pallor, corneas clear, PERRLA   Ears:    Ears appear intact with no abnormalities noted   Throat:   No oral lesions, no thrush, oral mucosa moist   Neck:   No adenopathy, supple, trachea midline, no thyromegaly, no   carotid bruit, no JVD   Back:     No kyphosis present, no scoliosis present, no skin lesions,      erythema or scars, no tenderness to percussion or                   palpation,   range of motion normal   Lungs:     Clear to auscultation,respirations regular, even and                  unlabored    Heart:    Regular rhythm and normal rate, normal S1 and S2, no            murmur, no gallop, no rub, no click   Chest Wall:    No abnormalities observed   Abdomen:     Normal " bowel sounds, no masses, no organomegaly, soft        non-tender, non-distended, no guarding, no rebound                tenderness   Rectal:     Deferred   Extremities:   Moves all extremities well, no edema, no cyanosis, no             redness   Pulses:   Pulses palpable and equal bilaterally   Skin:   No bleeding, bruising or rash   Lymph nodes:   No palpable adenopathy   Neurologic:   Cranial nerves 2 - 12 grossly intact, sensation intact, DTR       present and equal bilaterally          Results Review:     I reviewed the patient's new clinical results.  Results from last 7 days  Lab Units 11/08/17  0418   HEMOGLOBIN A1C % 7.9*       Results from last 7 days  Lab Units 11/08/17  0418   SODIUM mmol/L 141   POTASSIUM mmol/L 4.5   CHLORIDE mmol/L 107   CO2 mmol/L 21.0*   BUN mg/dL 22*   CREATININE mg/dL 1.30   GLUCOSE mg/dL 132*   CALCIUM mg/dL 8.8       Results from last 7 days  Lab Units 11/08/17 0418 11/07/17  1334   WBC 10*3/mm3 8.26 7.16   HEMOGLOBIN g/dL 13.1* 14.8   HEMATOCRIT % 40.2* 45.3   PLATELETS 10*3/mm3 155 181       Echo EF Estimated  Lab Results   Component Value Date    ECHOEFEST 68 11/08/2017     Physical Exam    Medication Review: yes    Assessment/Plan     Principal Problem:    Acute ST segment elevation myocardial infarction    Multivessel coronary artery disease-native vessels.  Angina free.    Essential hypertension.  His blood pressure was marginal this morning.  We will look to decrease the dose of his lisinopril.    The patient is doing well without evidence of recurrent angina.  He has had no signs or symptoms of congestive heart failure.  Echocardiogram demonstrates that his global ejection fraction is normal and there were no regional wall motion abnormalities.  He has had no arrhythmia.      I anticipate the patient may be discharged to home in the morning.    Plan for disposition:Where: home and When:  tomorrow    David Johnson MD  11/09/17  10:24 AM

## 2017-11-10 VITALS
OXYGEN SATURATION: 95 % | SYSTOLIC BLOOD PRESSURE: 116 MMHG | WEIGHT: 275.4 LBS | HEART RATE: 60 BPM | HEIGHT: 71 IN | DIASTOLIC BLOOD PRESSURE: 71 MMHG | TEMPERATURE: 98.3 F | BODY MASS INDEX: 38.56 KG/M2 | RESPIRATION RATE: 12 BRPM

## 2017-11-10 LAB
ANION GAP SERPL CALCULATED.3IONS-SCNC: 14.5 MMOL/L
BUN BLD-MCNC: 29 MG/DL (ref 7–20)
BUN/CREAT SERPL: 14.5 (ref 6.3–21.9)
CALCIUM SPEC-SCNC: 8.7 MG/DL (ref 8.4–10.2)
CHLORIDE SERPL-SCNC: 107 MMOL/L (ref 98–107)
CO2 SERPL-SCNC: 25 MMOL/L (ref 26–30)
CREAT BLD-MCNC: 2 MG/DL (ref 0.6–1.3)
GFR SERPL CREATININE-BSD FRML MDRD: 35 ML/MIN/1.73
GLUCOSE BLD-MCNC: 135 MG/DL (ref 74–98)
POTASSIUM BLD-SCNC: 4.5 MMOL/L (ref 3.5–5.1)
SODIUM BLD-SCNC: 142 MMOL/L (ref 137–145)

## 2017-11-10 PROCEDURE — 80048 BASIC METABOLIC PNL TOTAL CA: CPT | Performed by: INTERNAL MEDICINE

## 2017-11-10 PROCEDURE — 99239 HOSP IP/OBS DSCHRG MGMT >30: CPT | Performed by: INTERNAL MEDICINE

## 2017-11-10 RX ORDER — CARVEDILOL 3.12 MG/1
3.12 TABLET ORAL 2 TIMES DAILY
Qty: 60 TABLET | Refills: 11 | Status: SHIPPED | OUTPATIENT
Start: 2017-11-10 | End: 2017-12-01 | Stop reason: DRUGHIGH

## 2017-11-10 RX ORDER — LISINOPRIL 10 MG/1
10 TABLET ORAL DAILY
Qty: 30 TABLET | Refills: 11 | Status: SHIPPED | OUTPATIENT
Start: 2017-11-10 | End: 2017-12-01 | Stop reason: DRUGHIGH

## 2017-11-10 RX ORDER — RANOLAZINE 500 MG/1
500 TABLET, EXTENDED RELEASE ORAL EVERY 12 HOURS SCHEDULED
Qty: 60 TABLET | Refills: 11 | Status: SHIPPED | OUTPATIENT
Start: 2017-11-10 | End: 2018-04-19 | Stop reason: SDUPTHER

## 2017-11-10 RX ORDER — ASPIRIN 81 MG/1
81 TABLET, CHEWABLE ORAL DAILY
Qty: 30 TABLET | Refills: 11 | Status: SHIPPED | OUTPATIENT
Start: 2017-11-10

## 2017-11-10 RX ORDER — ISOSORBIDE MONONITRATE 30 MG/1
30 TABLET, EXTENDED RELEASE ORAL
Qty: 30 TABLET | Refills: 11 | Status: SHIPPED | OUTPATIENT
Start: 2017-11-10 | End: 2017-12-01 | Stop reason: DRUGHIGH

## 2017-11-10 RX ORDER — PANTOPRAZOLE SODIUM 40 MG/1
40 TABLET, DELAYED RELEASE ORAL
Qty: 60 TABLET | Refills: 11 | Status: SHIPPED | OUTPATIENT
Start: 2017-11-10

## 2017-11-10 RX ADMIN — RANOLAZINE 500 MG: 500 TABLET, FILM COATED, EXTENDED RELEASE ORAL at 08:35

## 2017-11-10 RX ADMIN — CARVEDILOL 3.12 MG: 6.25 TABLET, FILM COATED ORAL at 08:36

## 2017-11-10 RX ADMIN — ISOSORBIDE MONONITRATE 30 MG: 30 TABLET, EXTENDED RELEASE ORAL at 08:36

## 2017-11-10 RX ADMIN — DOCUSATE SODIUM 100 MG: 100 CAPSULE, LIQUID FILLED ORAL at 08:36

## 2017-11-10 RX ADMIN — ASPIRIN 81 MG 81 MG: 81 TABLET ORAL at 08:36

## 2017-11-10 RX ADMIN — LISINOPRIL 10 MG: 10 TABLET ORAL at 08:36

## 2017-11-10 RX ADMIN — PANTOPRAZOLE SODIUM 40 MG: 40 TABLET, DELAYED RELEASE ORAL at 06:35

## 2017-11-10 RX ADMIN — TICAGRELOR 90 MG: 90 TABLET ORAL at 08:36

## 2017-11-10 RX ADMIN — GLIPIZIDE 2.5 MG: 5 TABLET ORAL at 06:35

## 2017-11-10 NOTE — PLAN OF CARE
Problem: Patient Care Overview (Adult)  Goal: Plan of Care Review  Outcome: Outcome(s) achieved Date Met:  11/10/17    11/10/17 1017   Coping/Psychosocial Response Interventions   Plan Of Care Reviewed With patient;spouse   Patient Care Overview   Progress improving   Outcome Evaluation   Outcome Summary/Follow up Plan Pt. showing signs of improvement and plans to be discharged this morning. VSS at this time and will continue to monitor.       Goal: Adult Individualization and Mutuality  Outcome: Outcome(s) achieved Date Met:  11/10/17  Goal: Discharge Needs Assessment  Outcome: Outcome(s) achieved Date Met:  11/10/17    Problem: Cardiac Catheterization with/without PCI (Adult)  Goal: Signs and Symptoms of Listed Potential Problems Will be Absent or Manageable (Cardiac Catheterization with/without PCI)  Outcome: Outcome(s) achieved Date Met:  11/10/17

## 2017-11-10 NOTE — DISCHARGE SUMMARY
[facility]-Cardiology Discharge Summary    Date of Discharge:  11/10/2017    Discharge Diagnosis:   ST elevation myocardial infarction-inferior and lateral ST elevation  Multivessel coronary artery disease.  The native coronary arteries.  Presentation with unstable angina/acute coronary syndrome  Essential hypertension  Type 2 diabetes mellitus  Hypercholesterolemia  Obesity    Presenting Problem/History of Present Illness  Acute non-ST segment elevation myocardial infarction [I21.4]  Acute non-ST segment elevation myocardial infarction [I21.4]        Hospital Course  Patient is a 57 y.o. male presented with acute onset of chest discomfort.  The patient presented to the emergency room having a retrosternal burning discomfort which was initially felt to be possibly gastroesophageal reflux disease.  The patient's initial 12-lead electrocardiogram showed ST segment elevation in the inferior and lateral leads.  The patient was offered coronary angiography with interventional standby.  The patient initially refused any invasive testing or procedures.  His initial troponin was minimally elevated at 0.15.  After further discussion with the emergency room staff the patient eventually changed his mind and consented for catheterization.  Cardiac catheterization was performed in an uncomplicated fashion from the right femoral artery approach.  This demonstrated severe coronary artery disease with chronic occlusion of the distal posterior lateral left ventricular branch.  This lesion was felt to be too small for catheter-based revascularization.  In addition there was an ostial stenosis in the posterior descending artery branch of the right coronary artery which was treated successfully with angioplasty and placement of a drug-eluting stent.  In addition there was a long area of proximal and mid right coronary artery disease which was also treated with a drug-eluting stent.  The patient also underwent angioplasty and stenting of the  proximal circumflex utilizing a drug-eluting stent.  The left anterior descending and left main had no focal obstructive coronary disease.  The patient was demonstrated to have severe disease in a small diagonal branch which was felt to be too small for catheter-based revascularization.  The patient's ejection fraction was normal at greater than 60% with a focal regional wall motion abnormality along the posterior lateral wall.  Postprocedure the patient did well with no recurrent angina.  He did have some labile blood pressures.  He was initially hypertensive but his blood pressure improved significantly with adjustment of his medications.  He manifests no symptoms of congestive heart failure or arrhythmia.  He has been counseled extensively regarding the essential need for 12 months of dual antiplatelet therapy.  He has been provided a voucher for 30 days of free Brilinta.  He has been counseled that if he runs short on this medication or if the medication is too expensive to contact our office and we will arrange for samples or an alternative form of antiplatelet therapy.  He has been given specific instructions regarding the need for strict compliance with dual antiplatelet therapy and warned that the consequences could be stent thrombosis and myocardial infarction if he misses doses.  He will follow-up in our office in 2-3 weeks post discharge.  He has been given specific dietary instructions as well as activity precautions.  We will arrange for outpatient cardiac rehabilitation after his first outpatient cardiology clinic visit.    Procedures Performed  Procedure(s):  Coronary angiography  Left Heart Cath  STEPHANIE to mRCA, pRCA, Ostial PDA and mCircumflex  Echocardiogram       Consults:   Consults     No orders found from 10/9/2017 to 11/8/2017.        Pertinent Test Results: labs: Reviewed    Echo EF Estimated  Lab Results   Component Value Date    ECHOEFEST 68 11/08/2017     Condition on Discharge:   Stable    Vital Signs  Temp:  [98.3 °F (36.8 °C)-99.8 °F (37.7 °C)] 98.3 °F (36.8 °C)  Heart Rate:  [53-66] 56  Resp:  [16-18] 18  BP: ()/(49-66) 99/60    Physical Exam:     General Appearance:    Alert, cooperative, in no acute distress   Head:    Normocephalic, without obvious abnormality, atraumatic   Eyes:            Lids and lashes normal, conjunctivae and sclerae normal, no   icterus, no pallor, corneas clear, PERRLA   Ears:    Ears appear intact with no abnormalities noted   Throat:   No oral lesions, no thrush, oral mucosa moist   Neck:   No adenopathy, supple, trachea midline, no thyromegaly, no   carotid bruit, no JVD   Back:     No kyphosis present, no scoliosis present, no skin lesions,      erythema or scars, no tenderness to percussion or                   palpation,   range of motion normal   Lungs:     Clear to auscultation,respirations regular, even and                  unlabored    Heart:    Regular rhythm and normal rate, normal S1 and S2, no            murmur, no gallop, no rub, no click   Chest Wall:    No abnormalities observed   Abdomen:     Normal bowel sounds, no masses, no organomegaly, soft        non-tender, non-distended, no guarding, no rebound                tenderness   Rectal:     Deferred   Extremities:   Moves all extremities well, no edema, no cyanosis, no             redness   Pulses:   Pulses palpable and equal bilaterally   Skin:   No bleeding, bruising or rash   Lymph nodes:   No palpable adenopathy   Neurologic:   Cranial nerves 2 - 12 grossly intact, sensation intact, DTR       present and equal bilaterally         Discharge DispositionHome or Self Care    Discharge Medications   Jorge Collazo   Home Medication Instructions AILEEN:[id number]    Printed on:11/10/17 0715   Medication Information                      aspirin 81 MG chewable tablet  Chew 1 tablet Daily.             atorvastatin (LIPITOR) 40 MG tablet  Take 40 mg by mouth Daily.             carvedilol  (COREG) 3.125 MG tablet  Take 1 tablet by mouth 2 (Two) Times a Day.             glyBURIDE (DIAbeta) 5 MG tablet  Take 2.5 mg by mouth Daily With Breakfast. Pt unsure of dosage              isosorbide mononitrate (IMDUR) 30 MG 24 hr tablet  Take 1 tablet by mouth Daily.             lisinopril (PRINIVIL,ZESTRIL) 10 MG tablet  Take 1 tablet by mouth Daily.             pantoprazole (PROTONIX) 40 MG EC tablet  Take 1 tablet by mouth 2 (Two) Times a Day Before Meals.             ranolazine (RANEXA) 500 MG 12 hr tablet  Take 1 tablet by mouth Every 12 (Twelve) Hours.             ticagrelor (BRILINTA) 90 MG tablet tablet  Take 1 tablet by mouth 2 (Two) Times a Day.                 Discharge Diet: Cardiac/ Consistent Carbohydrate    Activity at Discharge: Routine Post-PCI    Follow-up Appointments: Alex 2-3 weeks  Future Appointments  Date Time Provider Department Center   12/5/2017 1:30 PM  [facility]  [facility]         Test Results Pending at Discharge: None       David Johnson MD  11/10/17  7:15 AM    Time: Discharge 35 min

## 2017-11-10 NOTE — PLAN OF CARE
Problem: Patient Care Overview (Adult)  Goal: Plan of Care Review  Outcome: Ongoing (interventions implemented as appropriate)    11/10/17 0331   Coping/Psychosocial Response Interventions   Plan Of Care Reviewed With patient;spouse   Patient Care Overview   Progress improving   Outcome Evaluation   Outcome Summary/Follow up Plan pt reports he may be discharged this am. states he is feeling better this shift       Goal: Discharge Needs Assessment  Outcome: Ongoing (interventions implemented as appropriate)    11/07/17 2136 11/08/17 1250 11/09/17 0238   Living Environment   Transportation Available --  --  family or friend will provide;car   Discharge Needs Assessment   Concerns To Be Addressed --  medication concerns;financial/insurance concerns --    Readmission Within The Last 30 Days --  no previous admission in last 30 days --    Equipment Needed After Discharge --  none --    Current Discharge Risk --  --  (noncompliance with medication and appts)   Discharge Planning Comments --  Home with wife.  --    Self-Care   Equipment Currently Used at Home glucometer --  --          Problem: Cardiac Catheterization with/without PCI (Adult)  Goal: Signs and Symptoms of Listed Potential Problems Will be Absent or Manageable (Cardiac Catheterization with/without PCI)  Outcome: Ongoing (interventions implemented as appropriate)    11/10/17 0331   Cardiac Catheterization with/without PCI   Problems Assessed (Cardiac Catheterization) all   Problems Present (Cardiac Catheterization) situational response  (no complications noted from cardiac cath)

## 2017-11-10 NOTE — DISCHARGE INSTRUCTIONS
Follow up with [facility] 2-3 weeks  Voucher for 30 days of free Brilinta from   Meds to Beds

## 2017-11-10 NOTE — PROGRESS NOTES
Continued Stay Note   Darvin     Patient Name: Jorge Collazo  MRN: [id number]  Today's Date: 11/10/2017    Admit Date: 11/7/2017          Discharge Plan       11/10/17 1117    Case Management/Social Work Plan    Plan Coordinated with patient, nurse, MD and pharmacy to help patient obtain his medications. Wife to get samples at MD office. APPlication for [employer] and Ranexa for patient assistance provided for family.               Discharge Codes     None        Expected Discharge Date and Time     Expected Discharge Date Expected Discharge Time    Nov 10, 2017             María Law

## 2017-11-13 NOTE — PROGRESS NOTES
Case Management Discharge Note    Final Note: Following for social and discharge planning.     Discharge Placement     No information found        Other: Other    Discharge Codes: 01  Discharge to home

## 2017-11-17 ENCOUNTER — TELEPHONE (OUTPATIENT)
Dept: CARDIOLOGY | Facility: CLINIC | Age: 57
End: 2017-11-17

## 2017-11-17 NOTE — TELEPHONE ENCOUNTER
Patient called asking for a letter to return to work on November 27th, 2017. 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 The letter needs to have No restrictions. Patient is scheduled to come in 12-1-17.   Can this letter be done?  Please review and advise.    Silvia Duncan MA

## 2017-12-01 ENCOUNTER — OFFICE VISIT (OUTPATIENT)
Dept: CARDIOLOGY | Facility: CLINIC | Age: 57
End: 2017-12-01

## 2017-12-01 VITALS
DIASTOLIC BLOOD PRESSURE: 80 MMHG | WEIGHT: 275 LBS | SYSTOLIC BLOOD PRESSURE: 160 MMHG | BODY MASS INDEX: 38.5 KG/M2 | OXYGEN SATURATION: 96 % | HEART RATE: 78 BPM | HEIGHT: 71 IN

## 2017-12-01 DIAGNOSIS — R06.02 SOB (SHORTNESS OF BREATH): ICD-10-CM

## 2017-12-01 DIAGNOSIS — I25.118 CORONARY ARTERY DISEASE OF NATIVE ARTERY OF NATIVE HEART WITH STABLE ANGINA PECTORIS (HCC): ICD-10-CM

## 2017-12-01 DIAGNOSIS — I10 ESSENTIAL HYPERTENSION: ICD-10-CM

## 2017-12-01 DIAGNOSIS — I21.4 ACUTE NON-ST SEGMENT ELEVATION MYOCARDIAL INFARCTION (HCC): Primary | ICD-10-CM

## 2017-12-01 PROCEDURE — 99214 OFFICE O/P EST MOD 30 MIN: CPT | Performed by: INTERNAL MEDICINE

## 2017-12-01 RX ORDER — CARVEDILOL 12.5 MG/1
12.5 TABLET ORAL 2 TIMES DAILY
Qty: 60 TABLET | Refills: 11 | Status: SHIPPED | OUTPATIENT
Start: 2017-12-01 | End: 2017-12-01 | Stop reason: SDUPTHER

## 2017-12-01 RX ORDER — ISOSORBIDE MONONITRATE 60 MG/1
60 TABLET, EXTENDED RELEASE ORAL EVERY MORNING
Qty: 30 TABLET | Refills: 11 | Status: SHIPPED | OUTPATIENT
Start: 2017-12-01 | End: 2017-12-01 | Stop reason: SDUPTHER

## 2017-12-01 RX ORDER — LISINOPRIL 40 MG/1
40 TABLET ORAL DAILY
Qty: 30 TABLET | Refills: 11 | Status: SHIPPED | OUTPATIENT
Start: 2017-12-01

## 2017-12-01 RX ORDER — LISINOPRIL 40 MG/1
40 TABLET ORAL DAILY
Qty: 30 TABLET | Refills: 11 | Status: SHIPPED | OUTPATIENT
Start: 2017-12-01 | End: 2017-12-01 | Stop reason: SDUPTHER

## 2017-12-01 RX ORDER — ISOSORBIDE MONONITRATE 60 MG/1
60 TABLET, EXTENDED RELEASE ORAL EVERY MORNING
Qty: 30 TABLET | Refills: 11 | Status: SHIPPED | OUTPATIENT
Start: 2017-12-01

## 2017-12-01 RX ORDER — CARVEDILOL 12.5 MG/1
12.5 TABLET ORAL 2 TIMES DAILY
Qty: 60 TABLET | Refills: 11 | Status: SHIPPED | OUTPATIENT
Start: 2017-12-01 | End: 2018-03-20 | Stop reason: DRUGHIGH

## 2017-12-01 RX ORDER — CLOPIDOGREL BISULFATE 75 MG/1
75 TABLET ORAL DAILY
Qty: 30 TABLET | Refills: 11 | Status: SHIPPED | OUTPATIENT
Start: 2017-12-01

## 2017-12-01 NOTE — PROGRESS NOTES
Subjective:     Encounter Date:12/01/2017      Patient ID: Jorge Collazo is a 57 y.o. male.    Chief Complaint:Chest pain, shortness of breath and fatigue  HPI  This is a 57-year-old male patient who presented to [facility] earlier in November with chest discomfort which she had initially described as indigestion.  The patient had a abnormal 12-lead electrocardiogram and elevated troponins consistent with myocardial infarction.  The patient initially refused cardiac catheterization but ultimately changed his mind and agreed to coronary angiography.  Heart catheterization disclosed severe disease in the right coronary artery as well as proximal circumflex.  The patient underwent angioplasty and stenting of the ostial and proximal posterior descending artery as well as the proximal to mid right coronary arteries.  The patient then underwent angioplasty and stenting of the proximal circumflex.  This resulted in plaque shift into the ostium of the left anterior descending which required additional stenting in that location.  The patient was also demonstrated to have a severe stenosis in a trivial small diagonal branch as well as a very small apical recurrent portion of the left anterior descending.  Both of these lesions were felt to be too small for stent placement.  The patient did well during his hospitalization with escalation of medical antianginal therapy.  Since hospital discharge the patient indicates he has had 3-4 episodes of resting chest discomfort which is localized to a discrete area of his midsternum and has an aching quality.  This discomfort does not radiate and has no exertional component.  The quality of the discomfort is different from that which she was experiencing with his myocardial infarction which was more of a indigestion discomfort with retrosternal burning.  The patient has also noticed some shortness of breath mostly with rest.  He is going back to work and is  quite active and has had no exertional dyspnea.  He has had no exertional chest arm neck jaw shoulder or back discomfort.  He has noticed some fatigue and indicates that he becomes tired more easily since his heart attack.  He has not chosen to participate in cardiac rehabilitation.  There is no orthopnea PND or lower extremity edema.  The patient indicates that his shortness of breath improves if he takes a deep breath and he reports continued compliance with his Brilinta which we have given him samples.  He has had no dizziness palpitations or syncope.  He remains a nonsmoker.  The following portions of the patient's history were reviewed and updated as appropriate: allergies, current medications, past family history, past medical history, past social history, past surgical history and problem  Review of Systems   Constitution: Positive for malaise/fatigue. Negative for chills, diaphoresis, fever, weakness, night sweats, weight gain and weight loss.   HENT: Negative for ear discharge, hearing loss, hoarse voice and nosebleeds.    Eyes: Negative for discharge, double vision, pain and photophobia.   Cardiovascular: Positive for chest pain. Negative for claudication, cyanosis, dyspnea on exertion, irregular heartbeat, leg swelling, near-syncope, orthopnea, palpitations, paroxysmal nocturnal dyspnea and syncope.   Respiratory: Positive for shortness of breath. Negative for cough, hemoptysis, sputum production and wheezing.    Endocrine: Negative for cold intolerance, heat intolerance, polydipsia, polyphagia and polyuria.   Hematologic/Lymphatic: Negative for adenopathy and bleeding problem. Does not bruise/bleed easily.   Skin: Negative for color change, flushing, itching and rash.   Musculoskeletal: Negative for muscle cramps, muscle weakness, myalgias and stiffness.   Gastrointestinal: Negative for abdominal pain, diarrhea, hematemesis, hematochezia, nausea and vomiting.   Genitourinary: Negative for dysuria,  frequency and nocturia.   Neurological: Negative for focal weakness, loss of balance, numbness, paresthesias and seizures.   Psychiatric/Behavioral: Negative for altered mental status, hallucinations and suicidal ideas.   Allergic/Immunologic: Negative for HIV exposure, hives and persistent infections.   All other systems reviewed and are negative.      Current Outpatient Prescriptions:   •  aspirin 81 MG chewable tablet, Chew and swallow 1 tablet by mouth once Daily., Disp: 30 tablet, Rfl: 11  •  atorvastatin (LIPITOR) 40 MG tablet, Take 40 mg by mouth Daily., Disp: , Rfl:   •  clopidogrel (PLAVIX) 75 MG tablet, Take 1 tablet by mouth Daily., Disp: 30 tablet, Rfl: 11  •  glyBURIDE (DIAbeta) 5 MG tablet, Take 2.5 mg by mouth Daily With Breakfast. Pt unsure of dosage , Disp: , Rfl:   •  pantoprazole (PROTONIX) 40 MG EC tablet, Take 1 tablet by mouth 2 (Two) Times a Day Before Meals., Disp: 60 tablet, Rfl: 11  •  ranolazine (RANEXA) 500 MG 12 hr tablet, Take 1 tablet by mouth Every 12 (Twelve) Hours., Disp: 60 tablet, Rfl: 11  •  carvedilol (COREG) 12.5 MG tablet, Take 1 tablet by mouth 2 (Two) Times a Day., Disp: 60 tablet, Rfl: 11  •  isosorbide mononitrate (IMDUR) 60 MG 24 hr tablet, Take 1 tablet by mouth Every Morning., Disp: 30 tablet, Rfl: 11  •  lisinopril (PRINIVIL,ZESTRIL) 40 MG tablet, Take 1 tablet by mouth Daily., Disp: 30 tablet, Rfl: 11     Objective:     Physical Exam   Constitutional: He is oriented to person, place, and time. He appears well-developed and well-nourished.   HENT:   Head: Normocephalic and atraumatic.   Eyes: Conjunctivae are normal. No scleral icterus.   Cardiovascular: Normal rate, regular rhythm, normal heart sounds and intact distal pulses.  Exam reveals no gallop and no friction rub.    No murmur heard.  Pulmonary/Chest: Effort normal and breath sounds normal. No respiratory distress.   Abdominal: Soft. Bowel sounds are normal. There is no tenderness.   Musculoskeletal: He  "exhibits no edema.   Neurological: He is alert and oriented to person, place, and time.   Skin: Skin is warm and dry.   Psychiatric: He has a normal mood and affect. His behavior is normal.     Blood pressure 160/80, pulse 78, height 71\" (180.3 cm), weight 275 lb (125 kg), SpO2 96 %.   Lab Review:       Assessment:         1. Acute non-ST segment elevation myocardial infarction  The patient has undergone multivessel percutaneous coronary revascularization after presentation with an acute coronary syndrome.    2. Coronary artery disease of native artery of native heart with stable angina pectoris  In addition to the multiple lesions which were treated with drug-eluting stents the patient has residual severe disease in a small diagonal branch as well as the apical recurrent portion of the left anterior descending.  Both of these lesions are felt to be too small for meaningful revascularization.  The patient has developed some atypical chest discomfort which is different in quality than that which was present with his initial acute coronary syndrome presentation.  This may be related to his poor blood pressure control.  This could be related to residual small vessel disease.  This could also represent noncardiac symptoms.    3. Essential hypertension  Patient's blood pressure is poorly controlled today.  He reports compliance with his medications.    4. SOB (shortness of breath)  Some of the shortness of breath I suspect is due to a side effect of the Brilinta.  Procedures     Plan:       The patient is currently down to his last day of Brilinta samples and we do not have any other samples available at this time.  As he has completed nearly a one-month course of Brilinta, as well as the potential for dyspnea related side effects as well as the fact that the patient has no insurance and cannot afford the medication we will change Brilinta to generic Plavix-clopidgrel.  The patient has been counseled to continue dual " antiplatelet therapy for a minimum of 12 months uninterrupted.  Given the diffuseness of his coronary disease consideration should be given to indefinite dual antiplatelet therapy.  I have recommended increasing his Imdur from 30 mg once a day to 60 mg once per day.  I have recommended increasing his lisinopril from 20 mg a day to 40 mg per day.  I have recommended increasing his Coreg from 3.125 mg twice a day to 12.5 mg twice a day.  We have given him samples of Ranexa to last for the next 6 months.  No additional cardiovascular testing is necessary at this time.  If the patient's chest discomfort persist after controlling his heart rate and blood pressure we will look to obtain a vasodilator nuclear stress test to assess his overall residual ischemic burden.  Patient has been offered outpatient cardiac rehabilitation but indicates that he is unable to afford this and cannot take off any additional time from his job as a .  He has no insurance through his occupation and has chosen not to participate in the afford double cataract.  We are making every effort to insure that he is on generic medications to keep down his prescription drug cost.  We are also providing him with as many samples as we have available.

## 2017-12-28 NOTE — ED PROVIDER NOTES
Detail Level: Detailed Subjective   HPI Comments: 57-year-old male with no previous cardiac history other than a negative stress test 5 years ago in [place], according to his report.  He does have a history of hypertension, diabetes mellitus and a family history of CAD.  He does not smoke cigarettes or have hyperlipidemia.    He is here with a one-day history of a moderate burning sensation across his anterior chest radiating to both shoulders and down both arms without associated shortness of breath.  She does have associated nausea without vomiting, abdominal pain or diaphoresis.  Symptoms are progressively worsening, prompting his visit to the ER.  Symptoms started while driving his car and are no worse with exertion or deep breathing.    Aggravating factors: None.  Alleviating factors: None.  Treatment prior to arrival: aspirin prior to arrival.      History provided by:  Patient  History limited by: nothing.   used: No        Review of Systems   Constitutional: Negative.    HENT: Negative.    Eyes: Negative.    Respiratory: Negative.    Cardiovascular: Positive for chest pain.   Gastrointestinal: Positive for nausea. Negative for abdominal pain.   Endocrine: Negative.    Genitourinary: Negative for dysuria.   Musculoskeletal: Negative.    Skin: Negative.    Allergic/Immunologic: Negative.    Neurological: Negative.    Hematological: Negative.    Psychiatric/Behavioral: Negative.    All other systems reviewed and are negative.      Past Medical History:   Diagnosis Date   • Diabetes mellitus    • Hypertension        No Known Allergies    History reviewed. No pertinent surgical history.    Family History   Problem Relation Age of Onset   • No Known Problems Mother    • Cancer Father    • Diabetes Father    • No Known Problems Brother        Social History     Social History   • Marital status:      Spouse name: N/A   • Number of children: N/A   • Years of education: N/A     Social History Main Topics   •  Smoking status: Never Smoker   • Smokeless tobacco: Never Used   • Alcohol use No   • Drug use: No   • Sexual activity: Defer     Other Topics Concern   • None     Social History Narrative   • None           Objective   Physical Exam   Constitutional: He is oriented to person, place, and time. He appears well-developed and well-nourished. No distress.   HENT:   Head: Normocephalic and atraumatic.   Right Ear: External ear normal.   Left Ear: External ear normal.   Eyes: EOM are normal. Pupils are equal, round, and reactive to light.   Neck: Normal range of motion. Neck supple.   Cardiovascular: Normal rate, regular rhythm and normal heart sounds.    Pulmonary/Chest: Effort normal and breath sounds normal. No stridor. He has no wheezes. He exhibits no tenderness.   Abdominal: Soft. He exhibits no distension. There is no tenderness. There is no rebound and no guarding.   Musculoskeletal: Normal range of motion. He exhibits no edema.   Neurological: He is alert and oriented to person, place, and time.   Skin: Skin is warm and dry. No rash noted. He is not diaphoretic.   Psychiatric: He has a normal mood and affect. His behavior is normal. Judgment and thought content normal.   Nursing note and vitals reviewed.      ECG 12 Lead    Date/Time: 11/7/2017 2:00 PM  Performed by: DYLAN JACOBS  Authorized by: [facility]   Comments: EKG: Sinus bradycardia rate of 56.  Poor anterior R-wave progression.  ST depression in V2.  Hyperacute T waves in the inferior leads.  No ectopy.               ED Course  ED Course   Comment By Time   Dr. Johnson has reviewed the patient's EKG.  Call the house supervisor will take the patient to the Cath Lab.  Aware of the creatinine of 1.7.  We will therefore give IV fluids. Dylan Jacobs PA-C 11/07 1531   Chest pain is nearly gone at this time.  Patient refusing morphine.  I spoke to Dr. Johnson again: He has received aspirin.  No Lovenox or nitroglycerin intravenously.  RN  aware to give IV fluids.  Initial EKG that was faxed to Dr. Johnson was apparently not received by his office.  Second fax was received by his office and reviewed.  Concerned about inferior hyperacute T waves.  No definite ST elevation. Hunter Guerrier PA-C 11/07 1542   Patient refusing to go to the Cath Lab related to financial concerns.  He is aware that he could die.  Dr. Johsnon has seen the patient in the emergency room.  Refusing to stay in the hospital as well.  We will therefore repeat his cardiac enzymes. Hunter Guerrier PA-C 11/07 1602   Patient is aware of the elevated troponin.  Agreeable to go to the heart cath lab.  He is chest pain-free. When I spoke to Dr. Johnson earlier, no nitroglycerin or Lovenox. Hunter Guerrier PA-C 11/07 7628                  MDM  Number of Diagnoses or Management Options  Acute non-ST segment elevation myocardial infarction: new and requires workup  History of diabetes mellitus: new and requires workup  Renal insufficiency: new and requires workup     Amount and/or Complexity of Data Reviewed  Clinical lab tests: reviewed and ordered  Tests in the radiology section of CPT®: ordered and reviewed  Tests in the medicine section of CPT®: ordered and reviewed  Decide to obtain previous medical records or to obtain history from someone other than the patient: yes  Discuss the patient with other providers: yes  Independent visualization of images, tracings, or specimens: yes    Risk of Complications, Morbidity, and/or Mortality  Presenting problems: moderate  Diagnostic procedures: low  Management options: moderate  General comments: Patient going to the Cath Lab for heart catheter.  No old creatinine to compare to.  He is being given IV fluids to help correct his creatinine.    Critical Care  Total time providing critical care: 30-74 minutes    Patient Progress  Patient progress: stable      Final diagnoses:   Renal insufficiency   History of diabetes mellitus   Acute non-ST  segment elevation myocardial infarction            Hunter Guerrier PA-C  11/07/17 1544       Hunter Guerrier PA-C  11/07/17 1727       Hunter Guerrier PA-C  11/07/17 1728       Hunter Guerrier PA-C  11/07/17 1746       Hunter Guerrier PA-C  11/07/17 8336     Detail Level: Zone

## 2018-03-20 ENCOUNTER — OFFICE VISIT (OUTPATIENT)
Dept: CARDIOLOGY | Facility: CLINIC | Age: 58
End: 2018-03-20

## 2018-03-20 VITALS
SYSTOLIC BLOOD PRESSURE: 172 MMHG | HEART RATE: 66 BPM | WEIGHT: 276 LBS | RESPIRATION RATE: 20 BRPM | OXYGEN SATURATION: 98 % | HEIGHT: 71 IN | DIASTOLIC BLOOD PRESSURE: 96 MMHG | BODY MASS INDEX: 38.64 KG/M2

## 2018-03-20 DIAGNOSIS — I20.9 ANGINA PECTORIS (HCC): ICD-10-CM

## 2018-03-20 DIAGNOSIS — I10 ESSENTIAL HYPERTENSION: Primary | ICD-10-CM

## 2018-03-20 DIAGNOSIS — I25.110 CORONARY ARTERY DISEASE INVOLVING NATIVE CORONARY ARTERY OF NATIVE HEART WITH UNSTABLE ANGINA PECTORIS (HCC): ICD-10-CM

## 2018-03-20 PROCEDURE — 99214 OFFICE O/P EST MOD 30 MIN: CPT | Performed by: INTERNAL MEDICINE

## 2018-03-20 RX ORDER — CARVEDILOL 25 MG/1
25 TABLET ORAL 2 TIMES DAILY
Qty: 180 TABLET | Refills: 4 | Status: SHIPPED | OUTPATIENT
Start: 2018-03-20

## 2018-03-20 RX ORDER — AMLODIPINE BESYLATE 10 MG/1
10 TABLET ORAL DAILY
Qty: 90 TABLET | Refills: 4 | Status: SHIPPED | OUTPATIENT
Start: 2018-03-20

## 2018-03-20 NOTE — PROGRESS NOTES
Subjective:     Encounter Date:03/20/2018      Patient ID: Jorge Collazo is a 57 y.o. male.    Chief Complaint:Chest pain and shortness of breath  HPI  This is a 57-year-old male patient who is 3 months out from a multivessel coronary revascularization occurring in the setting of a non-ST elevation myocardial infarction.  The patient reports that since his last follow-up he has had progressively worsening chest discomfort and shortness of breath mainly with activity but over the last few weeks this has now progressed to discomfort occurring at rest.  The patient reports a pressure sensation diffusely across his central chest which radiates into his left arm associated with physical activities that is typically relieved within 10-15 minutes of rest.  Over the last several weeks the patient's chest discomfort has lasted for a longer period of time and has required sublingual nitroglycerin administration.  The patient indicates the quality of the discomfort is identical to that which she was experiencing prior to his myocardial infarction.  The patient indicates that he has continued to have some degree of chest discomfort since his initial presentation which was attributed to small vessel disease in the distal circumflex and a small diagonal branch which was not amenable to any form of revascularization.  However the chest discomfort over the last several weeks has gotten much more intense and it occurred more often with longer duration.  The patient has no orthopnea PND or lower extremity edema.  He reports generalized weakness and easy fatigue.  He feels tired we can exhausted after episodes of chest discomfort.  His chest discomfort is occurring on a daily basis and multiple times per day depending on his level of physical activity.  The discomfort is associated with shortness of breath and diaphoresis with occasional nausea.  He has no dizziness palpitations or syncope.  He remains a lifelong nonsmoker.   He reports compliance with his medications.  The following portions of the patient's history were reviewed and updated as appropriate: allergies, current medications, past family history, past medical history, past social history, past surgical history and problem  Review of Systems   Constitution: Positive for diaphoresis. Negative for chills, fever, malaise/fatigue, weight gain and weight loss.   HENT: Negative for ear discharge, hearing loss, hoarse voice and nosebleeds.    Eyes: Negative for discharge, double vision, pain and photophobia.   Cardiovascular: Positive for chest pain and dyspnea on exertion. Negative for claudication, cyanosis, irregular heartbeat, leg swelling, near-syncope, orthopnea, palpitations, paroxysmal nocturnal dyspnea and syncope.   Respiratory: Positive for shortness of breath. Negative for cough, hemoptysis, sputum production and wheezing.    Endocrine: Negative for cold intolerance, heat intolerance, polydipsia, polyphagia and polyuria.   Hematologic/Lymphatic: Negative for adenopathy and bleeding problem. Does not bruise/bleed easily.   Skin: Negative for color change, flushing, itching and rash.   Musculoskeletal: Negative for muscle cramps, muscle weakness, myalgias and stiffness.   Gastrointestinal: Negative for abdominal pain, diarrhea, hematemesis, hematochezia, nausea and vomiting.   Genitourinary: Negative for dysuria, frequency and nocturia.   Neurological: Negative for focal weakness, loss of balance, numbness, paresthesias and seizures.   Psychiatric/Behavioral: Negative for altered mental status, hallucinations and suicidal ideas.   Allergic/Immunologic: Negative for HIV exposure, hives and persistent infections.           Current Outpatient Prescriptions:   •  amLODIPine (NORVASC) 10 MG tablet, Take 1 tablet by mouth Daily., Disp: 90 tablet, Rfl: 4  •  aspirin 81 MG chewable tablet, Chew and swallow 1 tablet by mouth once Daily., Disp: 30 tablet, Rfl: 11  •  atorvastatin  "(LIPITOR) 40 MG tablet, Take 1 tablet by mouth Daily., Disp: 30 tablet, Rfl: 4  •  carvedilol (COREG) 25 MG tablet, Take 1 tablet by mouth 2 (Two) Times a Day., Disp: 180 tablet, Rfl: 4  •  clopidogrel (PLAVIX) 75 MG tablet, Take 1 tablet by mouth Daily., Disp: 30 tablet, Rfl: 11  •  glyBURIDE (DIAbeta) 5 MG tablet, Take 1/2 tablets by mouth Daily., Disp: 15 tablet, Rfl: 4  •  isosorbide mononitrate (IMDUR) 60 MG 24 hr tablet, Take 1 tablet by mouth Every Morning., Disp: 30 tablet, Rfl: 11  •  lisinopril (PRINIVIL,ZESTRIL) 40 MG tablet, Take 1 tablet by mouth Daily., Disp: 30 tablet, Rfl: 11  •  pantoprazole (PROTONIX) 40 MG EC tablet, Take 1 tablet by mouth 2 (Two) Times a Day Before Meals., Disp: 60 tablet, Rfl: 11  •  ranolazine (RANEXA) 500 MG 12 hr tablet, Take 1 tablet by mouth Every 12 (Twelve) Hours., Disp: 60 tablet, Rfl: 11     Objective:     Physical Exam   Constitutional: He is oriented to person, place, and time. He appears well-developed and well-nourished.   HENT:   Head: Normocephalic and atraumatic.   Eyes: Conjunctivae are normal. No scleral icterus.   Neck: No JVD present.   Cardiovascular: Normal rate, regular rhythm, normal heart sounds and intact distal pulses.  Exam reveals no gallop and no friction rub.    No murmur heard.  Pulmonary/Chest: Effort normal and breath sounds normal. No respiratory distress.   Abdominal: Soft. Bowel sounds are normal. There is no tenderness.   Musculoskeletal: He exhibits no edema.   Neurological: He is alert and oriented to person, place, and time. Coordination normal.   Skin: Skin is warm and dry. No rash noted.   Psychiatric: He has a normal mood and affect. His behavior is normal.     Blood pressure 172/96, pulse 66, resp. rate 20, height 180.3 cm (70.98\"), weight 125 kg (276 lb), SpO2 98 %.   Lab Review:       Assessment:         1. Essential hypertension  Blood pressure has been poorly controlled on his last 2 visits.  - Adult Transthoracic Echo Complete W/ " Cont if Necessary Per Protocol    2. Coronary artery disease involving native coronary artery of native heart with unstable angina pectoris  The patient's chest discomfort is consistent with unstable angina.  - Stress Test With Myocardial Perfusion Two Day  - Adult Transthoracic Echo Complete W/ Cont if Necessary Per Protocol    3. Angina pectoris  The patient's chest discomfort suggest a pattern of unstable angina.  He is known to have complex coronary artery disease with multiple prior stent procedures.  - Adult Transthoracic Echo Complete W/ Cont if Necessary Per Protocol    Procedures     Plan:       I have recommended a vasodilator nuclear stress test as well as an echocardiogram.  I have recommended increasing his Coreg to 25 mg orally twice per day.  I have recommended starting amlodipine 10 mg orally twice per day.  He is instructed to continue using his aspirin and Lipitor lisinopril Plavix Imdur and Ranexa all unchanged.  The importance of diet exercise and weight management have been reinforced with the patient.  Further recommendations will be predicated on the results of his outpatient testing.  If the patient demonstrates a significant ischemic burden he will require relook coronary angiography with interventional standby.

## 2018-04-12 ENCOUNTER — APPOINTMENT (OUTPATIENT)
Dept: NUCLEAR MEDICINE | Facility: HOSPITAL | Age: 58
End: 2018-04-12
Attending: INTERNAL MEDICINE

## 2018-04-13 ENCOUNTER — APPOINTMENT (OUTPATIENT)
Dept: NUCLEAR MEDICINE | Facility: HOSPITAL | Age: 58
End: 2018-04-13
Attending: INTERNAL MEDICINE

## 2018-04-19 RX ORDER — RANOLAZINE 500 MG/1
500 TABLET, EXTENDED RELEASE ORAL EVERY 12 HOURS SCHEDULED
Qty: 28 TABLET | Refills: 0 | COMMUNITY
Start: 2018-04-19 | End: 2018-06-20 | Stop reason: SDUPTHER

## 2018-06-20 RX ORDER — RANOLAZINE 500 MG/1
500 TABLET, EXTENDED RELEASE ORAL EVERY 12 HOURS SCHEDULED
Qty: 60 TABLET | Refills: 0 | COMMUNITY
Start: 2018-06-20

## (undated) DEVICE — ELECTRD PAD DEFIB A/

## (undated) DEVICE — CATH F6 ST JR 4 100CM: Brand: SUPERTORQUE

## (undated) DEVICE — GUIDE CATHETER: Brand: MACH1™

## (undated) DEVICE — ANGIO-SEAL VIP VASCULAR CLOSURE DEVICE: Brand: ANGIO-SEAL

## (undated) DEVICE — AVANTI + 5F STD W/GW: Brand: AVANTI

## (undated) DEVICE — TREK CORONARY DILATATION CATHETER 2.50 MM X 20 MM / RAPID-EXCHANGE: Brand: TREK

## (undated) DEVICE — GUIDELINER CATHETERS ARE INTENDED TO BE USED IN CONJUNCTION WITH GUIDE CATHETERS TO ACCESS DISCRETE REGIONS OF THE CORONARY AND/OR PERIPHERAL VASCULATURE, AND TO FACILITATE PLACEMENT OF INTERVENTIONAL DEVICES.: Brand: GUIDELINER® V3 CATHETER

## (undated) DEVICE — BALN EUPHORA 3.5X15MM

## (undated) DEVICE — TREK CORONARY DILATATION CATHETER 3.0 MM X 15 MM / RAPID-EXCHANGE: Brand: TREK

## (undated) DEVICE — GW INQWIRE FC PTFE STD J/1.5 .035 260

## (undated) DEVICE — SI AVANTI+ 6F STD W/GW  NO OBT: Brand: AVANTI

## (undated) DEVICE — RUNTHROUGH NS EXTRA FLOPPY PTCA GUIDEWIRE: Brand: RUNTHROUGH

## (undated) DEVICE — NDL ANGIOGR ADV THN SMOTH SGLWALL 21G 1.5

## (undated) DEVICE — NDL ART WING 18G 7CM

## (undated) DEVICE — BALN EUPHORA 3.5X12MM

## (undated) DEVICE — CATH F6 ST JL 4 100CM: Brand: SUPERTORQUE

## (undated) DEVICE — ANGIOGRAPHIC CATHETER: Brand: EXPO™

## (undated) DEVICE — GLIDESHEATH SLENDER STAINLESS STEEL KIT: Brand: GLIDESHEATH SLENDER